# Patient Record
Sex: MALE | Race: WHITE | NOT HISPANIC OR LATINO | URBAN - METROPOLITAN AREA
[De-identification: names, ages, dates, MRNs, and addresses within clinical notes are randomized per-mention and may not be internally consistent; named-entity substitution may affect disease eponyms.]

---

## 2018-04-11 ENCOUNTER — OFFICE VISIT CONVERTED (OUTPATIENT)
Dept: CARDIOLOGY | Facility: CLINIC | Age: 83
End: 2018-04-11
Attending: INTERNAL MEDICINE

## 2018-04-11 ENCOUNTER — CONVERSION ENCOUNTER (OUTPATIENT)
Dept: CARDIOLOGY | Facility: CLINIC | Age: 83
End: 2018-04-11

## 2019-01-04 ENCOUNTER — HOSPITAL ENCOUNTER (OUTPATIENT)
Dept: URGENT CARE | Facility: CLINIC | Age: 84
Discharge: HOME | End: 2019-01-04
Attending: EMERGENCY MEDICINE

## 2019-02-09 ENCOUNTER — HOSPITAL ENCOUNTER (OUTPATIENT)
Dept: OTHER | Facility: HOSPITAL | Age: 84
Discharge: HOME | End: 2019-02-09
Attending: INTERNAL MEDICINE

## 2019-02-09 LAB
ALBUMIN SERPL-MCNC: 4.6 G/DL (ref 3.5–5)
ALBUMIN/GLOB SERPL: 1.4 {RATIO} (ref 1.4–2.6)
ALP SERPL-CCNC: 61 U/L (ref 56–155)
ALT SERPL-CCNC: 28 U/L (ref 10–40)
ANION GAP SERPL CALC-SCNC: 19 MMOL/L (ref 8–19)
AST SERPL-CCNC: 29 U/L (ref 15–50)
BASOPHILS # BLD AUTO: 0.02 10*3/UL (ref 0–0.2)
BASOPHILS NFR BLD AUTO: 0.25 % (ref 0–3)
BILIRUB SERPL-MCNC: 0.43 MG/DL (ref 0.2–1.3)
BUN SERPL-MCNC: 20 MG/DL (ref 5–25)
BUN/CREAT SERPL: 19 {RATIO} (ref 6–20)
CALCIUM SERPL-MCNC: 10.5 MG/DL (ref 8.7–10.4)
CHLORIDE SERPL-SCNC: 101 MMOL/L (ref 99–111)
CONV CO2: 25 MMOL/L (ref 22–32)
CONV TOTAL PROTEIN: 7.9 G/DL (ref 6.3–8.2)
CREAT UR-MCNC: 1.07 MG/DL (ref 0.7–1.2)
EOSINOPHIL # BLD AUTO: 0.32 10*3/UL (ref 0–0.7)
EOSINOPHIL # BLD AUTO: 3.56 % (ref 0–7)
ERYTHROCYTE [DISTWIDTH] IN BLOOD BY AUTOMATED COUNT: 12.9 % (ref 11.5–14.5)
EST. AVERAGE GLUCOSE BLD GHB EST-MCNC: 214 MG/DL
GFR SERPLBLD BASED ON 1.73 SQ M-ARVRAT: >60 ML/MIN/{1.73_M2}
GLOBULIN UR ELPH-MCNC: 3.3 G/DL (ref 2–3.5)
GLUCOSE SERPL-MCNC: 212 MG/DL (ref 70–99)
HBA1C MFR BLD: 14.1 G/DL (ref 14–18)
HBA1C MFR BLD: 9.1 % (ref 3.5–5.7)
HCT VFR BLD AUTO: 40.7 % (ref 42–52)
LYMPHOCYTES # BLD AUTO: 2.48 10*3/UL (ref 1–5)
MCH RBC QN AUTO: 30.7 PG (ref 27–31)
MCHC RBC AUTO-ENTMCNC: 34.7 G/DL (ref 33–37)
MCV RBC AUTO: 88.3 FL (ref 80–96)
MONOCYTES # BLD AUTO: 0.79 10*3/UL (ref 0.2–1.2)
MONOCYTES NFR BLD AUTO: 8.8 % (ref 3–10)
NEUTROPHILS # BLD AUTO: 5.38 10*3/UL (ref 2–8)
NEUTROPHILS NFR BLD AUTO: 59.8 % (ref 30–85)
NRBC BLD AUTO-RTO: 0 % (ref 0–0.01)
OSMOLALITY SERPL CALC.SUM OF ELEC: 299 MOSM/KG (ref 273–304)
PLATELET # BLD AUTO: 191 10*3/UL (ref 130–400)
PMV BLD AUTO: 8.2 FL (ref 7.4–10.4)
POTASSIUM SERPL-SCNC: 4.9 MMOL/L (ref 3.5–5.3)
RBC # BLD AUTO: 4.61 10*6/UL (ref 4.7–6.1)
SODIUM SERPL-SCNC: 140 MMOL/L (ref 135–147)
TSH SERPL-ACNC: 4.56 M[IU]/L (ref 0.27–4.2)
VARIANT LYMPHS NFR BLD MANUAL: 27.6 % (ref 20–45)
WBC # BLD AUTO: 9.01 10*3/UL (ref 4.8–10.8)

## 2019-02-22 ENCOUNTER — HOSPITAL ENCOUNTER (OUTPATIENT)
Dept: GENERAL RADIOLOGY | Facility: HOSPITAL | Age: 84
Discharge: HOME | End: 2019-02-22
Attending: INTERNAL MEDICINE

## 2019-04-02 ENCOUNTER — HOSPITAL ENCOUNTER (OUTPATIENT)
Dept: URGENT CARE | Facility: CLINIC | Age: 84
Discharge: HOME | End: 2019-04-02
Attending: PHYSICIAN ASSISTANT

## 2019-04-04 LAB
AMPICILLIN SUSC ISLT: <=0.25
BACTERIA UR CULT: ABNORMAL
CEFOTAXIME SUSC ISLT: <=0.12
CEFTRIAXONE SUSC ISLT: <=0.12
LEVOFLOXACIN SUSC ISLT: 1
PENICILLIN G SUSC ISLT: <=0.06
TETRACYCLINE SUSC ISLT: >=16
VANCOMYCIN SUSC ISLT: 0.5

## 2019-04-08 ENCOUNTER — HOSPITAL ENCOUNTER (OUTPATIENT)
Dept: LAB | Facility: HOSPITAL | Age: 84
Discharge: HOME | End: 2019-04-08
Attending: PHYSICIAN ASSISTANT

## 2019-04-08 LAB
ALBUMIN SERPL-MCNC: 4.2 G/DL (ref 3.5–5)
ALBUMIN/GLOB SERPL: 1 {RATIO} (ref 1.4–2.6)
ALP SERPL-CCNC: 89 U/L (ref 56–155)
ALT SERPL-CCNC: 18 U/L (ref 10–40)
ANION GAP SERPL CALC-SCNC: 20 MMOL/L (ref 8–19)
APPEARANCE UR: CLEAR
AST SERPL-CCNC: 24 U/L (ref 15–50)
BASOPHILS # BLD AUTO: 0.07 10*3/UL (ref 0–0.2)
BASOPHILS NFR BLD AUTO: 0.7 % (ref 0–3)
BILIRUB SERPL-MCNC: 0.31 MG/DL (ref 0.2–1.3)
BILIRUB UR QL: NEGATIVE
BUN SERPL-MCNC: 21 MG/DL (ref 5–25)
BUN/CREAT SERPL: 18 {RATIO} (ref 6–20)
CALCIUM SERPL-MCNC: 10.2 MG/DL (ref 8.7–10.4)
CHLORIDE SERPL-SCNC: 99 MMOL/L (ref 99–111)
COLOR UR: YELLOW
CONV ABS IMM GRAN: 0.1 10*3/UL (ref 0–0.2)
CONV BACTERIA: NEGATIVE
CONV CO2: 25 MMOL/L (ref 22–32)
CONV COLLECTION SOURCE (UA): ABNORMAL
CONV IMMATURE GRAN: 1 % (ref 0–1.8)
CONV TOTAL PROTEIN: 8.4 G/DL (ref 6.3–8.2)
CONV UROBILINOGEN IN URINE BY AUTOMATED TEST STRIP: 0.2 {EHRLICHU}/DL (ref 0.1–1)
CREAT UR-MCNC: 1.14 MG/DL (ref 0.7–1.2)
DEPRECATED RDW RBC AUTO: 46.1 FL (ref 35.1–43.9)
EOSINOPHIL # BLD AUTO: 0.23 10*3/UL (ref 0–0.7)
EOSINOPHIL # BLD AUTO: 2.4 % (ref 0–7)
ERYTHROCYTE [DISTWIDTH] IN BLOOD BY AUTOMATED COUNT: 13.4 % (ref 11.6–14.4)
GFR SERPLBLD BASED ON 1.73 SQ M-ARVRAT: 59 ML/MIN/{1.73_M2}
GLOBULIN UR ELPH-MCNC: 4.2 G/DL (ref 2–3.5)
GLUCOSE SERPL-MCNC: 256 MG/DL (ref 70–99)
GLUCOSE UR QL: >=1000 MG/DL
HBA1C MFR BLD: 13.2 G/DL (ref 14–18)
HCT VFR BLD AUTO: 42.9 % (ref 42–52)
HGB UR QL STRIP: ABNORMAL
KETONES UR QL STRIP: NEGATIVE MG/DL
LEUKOCYTE ESTERASE UR QL STRIP: NEGATIVE
LYMPHOCYTES # BLD AUTO: 2.77 10*3/UL (ref 1–5)
MCH RBC QN AUTO: 28.7 PG (ref 27–31)
MCHC RBC AUTO-ENTMCNC: 30.8 G/DL (ref 33–37)
MCV RBC AUTO: 93.3 FL (ref 80–96)
MONOCYTES # BLD AUTO: 0.84 10*3/UL (ref 0.2–1.2)
MONOCYTES NFR BLD AUTO: 8.7 % (ref 3–10)
NEUTROPHILS # BLD AUTO: 5.67 10*3/UL (ref 2–8)
NEUTROPHILS NFR BLD AUTO: 58.6 % (ref 30–85)
NITRITE UR QL STRIP: NEGATIVE
NRBC CBCN: 0 % (ref 0–0.7)
OSMOLALITY SERPL CALC.SUM OF ELEC: 298 MOSM/KG (ref 273–304)
PH UR STRIP.AUTO: 5 [PH] (ref 5–8)
PLATELET # BLD AUTO: 239 10*3/UL (ref 130–400)
PMV BLD AUTO: 10.8 FL (ref 9.4–12.4)
POTASSIUM SERPL-SCNC: 5.5 MMOL/L (ref 3.5–5.3)
PROT UR QL: NEGATIVE MG/DL
RBC # BLD AUTO: 4.6 10*6/UL (ref 4.7–6.1)
RBC #/AREA URNS HPF: ABNORMAL /[HPF]
SODIUM SERPL-SCNC: 138 MMOL/L (ref 135–147)
SP GR UR: 1.03 (ref 1–1.03)
VARIANT LYMPHS NFR BLD MANUAL: 28.6 % (ref 20–45)
WBC # BLD AUTO: 9.68 10*3/UL (ref 4.8–10.8)
WBC #/AREA URNS HPF: ABNORMAL /[HPF]

## 2019-04-10 LAB — BACTERIA UR CULT: NORMAL

## 2019-04-17 ENCOUNTER — HOSPITAL ENCOUNTER (OUTPATIENT)
Dept: LAB | Facility: HOSPITAL | Age: 84
Discharge: HOME | End: 2019-04-17
Attending: PHYSICIAN ASSISTANT

## 2019-04-17 LAB
ANION GAP SERPL CALC-SCNC: 19 MMOL/L (ref 8–19)
BUN SERPL-MCNC: 16 MG/DL (ref 5–25)
BUN/CREAT SERPL: 14 {RATIO} (ref 6–20)
CALCIUM SERPL-MCNC: 10 MG/DL (ref 8.7–10.4)
CHLORIDE SERPL-SCNC: 99 MMOL/L (ref 99–111)
CONV CO2: 24 MMOL/L (ref 22–32)
CREAT UR-MCNC: 1.15 MG/DL (ref 0.7–1.2)
GFR SERPLBLD BASED ON 1.73 SQ M-ARVRAT: 58 ML/MIN/{1.73_M2}
GLUCOSE SERPL-MCNC: 373 MG/DL (ref 70–99)
OSMOLALITY SERPL CALC.SUM OF ELEC: 300 MOSM/KG (ref 273–304)
POTASSIUM SERPL-SCNC: 5.1 MMOL/L (ref 3.5–5.3)
SODIUM SERPL-SCNC: 137 MMOL/L (ref 135–147)

## 2019-06-10 ENCOUNTER — OFFICE VISIT CONVERTED (OUTPATIENT)
Dept: SURGERY | Facility: CLINIC | Age: 84
End: 2019-06-10
Attending: UROLOGY

## 2019-07-09 ENCOUNTER — HOSPITAL ENCOUNTER (OUTPATIENT)
Dept: CT IMAGING | Facility: HOSPITAL | Age: 84
Discharge: HOME | End: 2019-07-09
Attending: PHYSICIAN ASSISTANT

## 2019-07-09 LAB
ALBUMIN SERPL-MCNC: 4.3 G/DL (ref 3.5–5)
ALBUMIN/GLOB SERPL: 1 {RATIO} (ref 1.4–2.6)
ALP SERPL-CCNC: 85 U/L (ref 56–155)
ALT SERPL-CCNC: 27 U/L (ref 10–40)
ANION GAP SERPL CALC-SCNC: 24 MMOL/L (ref 8–19)
AST SERPL-CCNC: 29 U/L (ref 15–50)
BASOPHILS # BLD AUTO: 0.1 10*3/UL (ref 0–0.2)
BASOPHILS NFR BLD AUTO: 0.8 % (ref 0–3)
BILIRUB SERPL-MCNC: 0.43 MG/DL (ref 0.2–1.3)
BUN SERPL-MCNC: 15 MG/DL (ref 5–25)
BUN/CREAT SERPL: 13 {RATIO} (ref 6–20)
CALCIUM SERPL-MCNC: 9.9 MG/DL (ref 8.7–10.4)
CHLORIDE SERPL-SCNC: 94 MMOL/L (ref 99–111)
CONV ABS IMM GRAN: 0.06 10*3/UL (ref 0–0.2)
CONV CO2: 22 MMOL/L (ref 22–32)
CONV IMMATURE GRAN: 0.5 % (ref 0–1.8)
CONV TOTAL PROTEIN: 8.4 G/DL (ref 6.3–8.2)
CREAT UR-MCNC: 1.15 MG/DL (ref 0.7–1.2)
DEPRECATED RDW RBC AUTO: 45.2 FL (ref 35.1–43.9)
EOSINOPHIL # BLD AUTO: 0.12 10*3/UL (ref 0–0.7)
EOSINOPHIL # BLD AUTO: 1 % (ref 0–7)
ERYTHROCYTE [DISTWIDTH] IN BLOOD BY AUTOMATED COUNT: 13.8 % (ref 11.6–14.4)
GFR SERPLBLD BASED ON 1.73 SQ M-ARVRAT: 58 ML/MIN/{1.73_M2}
GLOBULIN UR ELPH-MCNC: 4.1 G/DL (ref 2–3.5)
GLUCOSE SERPL-MCNC: 424 MG/DL (ref 70–99)
HBA1C MFR BLD: 13.4 G/DL (ref 14–18)
HCT VFR BLD AUTO: 43.2 % (ref 42–52)
LYMPHOCYTES # BLD AUTO: 2.95 10*3/UL (ref 1–5)
MCH RBC QN AUTO: 28 PG (ref 27–31)
MCHC RBC AUTO-ENTMCNC: 31 G/DL (ref 33–37)
MCV RBC AUTO: 90.2 FL (ref 80–96)
MONOCYTES # BLD AUTO: 1.33 10*3/UL (ref 0.2–1.2)
MONOCYTES NFR BLD AUTO: 11.2 % (ref 3–10)
NEUTROPHILS # BLD AUTO: 7.29 10*3/UL (ref 2–8)
NEUTROPHILS NFR BLD AUTO: 61.6 % (ref 30–85)
NRBC CBCN: 0 % (ref 0–0.7)
OSMOLALITY SERPL CALC.SUM OF ELEC: 299 MOSM/KG (ref 273–304)
PLATELET # BLD AUTO: 209 10*3/UL (ref 130–400)
PMV BLD AUTO: 11.3 FL (ref 9.4–12.4)
POTASSIUM SERPL-SCNC: 4.6 MMOL/L (ref 3.5–5.3)
RBC # BLD AUTO: 4.79 10*6/UL (ref 4.7–6.1)
SODIUM SERPL-SCNC: 135 MMOL/L (ref 135–147)
VARIANT LYMPHS NFR BLD MANUAL: 24.9 % (ref 20–45)
WBC # BLD AUTO: 11.85 10*3/UL (ref 4.8–10.8)

## 2019-07-16 ENCOUNTER — HOSPITAL ENCOUNTER (OUTPATIENT)
Dept: PET IMAGING | Facility: HOSPITAL | Age: 84
Discharge: HOME | End: 2019-07-16
Attending: PHYSICIAN ASSISTANT

## 2019-07-23 ENCOUNTER — OFFICE VISIT CONVERTED (OUTPATIENT)
Dept: ONCOLOGY | Facility: HOSPITAL | Age: 84
End: 2019-07-23
Attending: INTERNAL MEDICINE

## 2019-08-02 ENCOUNTER — HOSPITAL ENCOUNTER (OUTPATIENT)
Dept: GASTROENTEROLOGY | Facility: HOSPITAL | Age: 84
Setting detail: HOSPITAL OUTPATIENT SURGERY
Discharge: HOME | End: 2019-08-02
Attending: INTERNAL MEDICINE

## 2019-08-02 LAB — GLUCOSE BLD-MCNC: 147 MG/DL (ref 70–99)

## 2019-08-06 ENCOUNTER — HOSPITAL ENCOUNTER (OUTPATIENT)
Dept: ONCOLOGY | Facility: HOSPITAL | Age: 84
Discharge: HOME | End: 2019-08-06
Attending: INTERNAL MEDICINE

## 2019-08-06 ENCOUNTER — OFFICE VISIT CONVERTED (OUTPATIENT)
Dept: ONCOLOGY | Facility: HOSPITAL | Age: 84
End: 2019-08-06
Attending: INTERNAL MEDICINE

## 2019-08-06 LAB
ALBUMIN SERPL-MCNC: 4.4 G/DL (ref 3.5–5)
ALBUMIN/GLOB SERPL: 1.2 {RATIO} (ref 1.4–2.6)
ALP SERPL-CCNC: 75 U/L (ref 56–155)
ALT SERPL-CCNC: 18 U/L (ref 10–40)
ANION GAP SERPL CALC-SCNC: 21 MMOL/L (ref 8–19)
AST SERPL-CCNC: 23 U/L (ref 15–50)
BASOPHILS # BLD AUTO: 0.08 10*3/UL (ref 0–0.2)
BASOPHILS NFR BLD AUTO: 1 % (ref 0–3)
BILIRUB SERPL-MCNC: 0.28 MG/DL (ref 0.2–1.3)
BUN SERPL-MCNC: 13 MG/DL (ref 5–25)
BUN/CREAT SERPL: 14 {RATIO} (ref 6–20)
CALCIUM SERPL-MCNC: 10.2 MG/DL (ref 8.7–10.4)
CHLORIDE SERPL-SCNC: 99 MMOL/L (ref 99–111)
CONV ABS IMM GRAN: 0.03 10*3/UL (ref 0–0.2)
CONV CO2: 25 MMOL/L (ref 22–32)
CONV IMMATURE GRAN: 0.4 % (ref 0–1.8)
CONV TOTAL PROTEIN: 8 G/DL (ref 6.3–8.2)
CREAT UR-MCNC: 0.93 MG/DL (ref 0.7–1.2)
DEPRECATED RDW RBC AUTO: 47.5 FL (ref 35.1–43.9)
EOSINOPHIL # BLD AUTO: 0.31 10*3/UL (ref 0–0.7)
EOSINOPHIL # BLD AUTO: 3.7 % (ref 0–7)
ERYTHROCYTE [DISTWIDTH] IN BLOOD BY AUTOMATED COUNT: 14.4 % (ref 11.6–14.4)
GFR SERPLBLD BASED ON 1.73 SQ M-ARVRAT: >60 ML/MIN/{1.73_M2}
GLOBULIN UR ELPH-MCNC: 3.6 G/DL (ref 2–3.5)
GLUCOSE SERPL-MCNC: 187 MG/DL (ref 70–99)
HCT VFR BLD AUTO: 42.2 % (ref 42–52)
HGB BLD-MCNC: 13 G/DL (ref 14–18)
LYMPHOCYTES # BLD AUTO: 2.73 10*3/UL (ref 1–5)
LYMPHOCYTES NFR BLD AUTO: 32.5 % (ref 20–45)
MCH RBC QN AUTO: 27.7 PG (ref 27–31)
MCHC RBC AUTO-ENTMCNC: 30.8 G/DL (ref 33–37)
MCV RBC AUTO: 89.8 FL (ref 80–96)
MONOCYTES # BLD AUTO: 0.72 10*3/UL (ref 0.2–1.2)
MONOCYTES NFR BLD AUTO: 8.6 % (ref 3–10)
NEUTROPHILS # BLD AUTO: 4.52 10*3/UL (ref 2–8)
NEUTROPHILS NFR BLD AUTO: 53.8 % (ref 30–85)
NRBC CBCN: 0 % (ref 0–0.7)
OSMOLALITY SERPL CALC.SUM OF ELEC: 295 MOSM/KG (ref 273–304)
PLATELET # BLD AUTO: 207 10*3/UL (ref 130–400)
PMV BLD AUTO: 10.5 FL (ref 9.4–12.4)
POTASSIUM SERPL-SCNC: 4.9 MMOL/L (ref 3.5–5.3)
RBC # BLD AUTO: 4.7 10*6/UL (ref 4.7–6.1)
SODIUM SERPL-SCNC: 140 MMOL/L (ref 135–147)
WBC # BLD AUTO: 8.39 10*3/UL (ref 4.8–10.8)

## 2019-08-07 ENCOUNTER — HOSPITAL ENCOUNTER (OUTPATIENT)
Dept: CARDIOLOGY | Facility: HOSPITAL | Age: 84
Discharge: HOME | End: 2019-08-07
Attending: INTERNAL MEDICINE

## 2019-08-09 ENCOUNTER — HOSPITAL ENCOUNTER (OUTPATIENT)
Dept: RADIATION ONCOLOGY | Facility: HOSPITAL | Age: 84
Setting detail: RECURRING SERIES
Discharge: HOME | End: 2019-08-31
Attending: RADIOLOGY

## 2019-08-12 ENCOUNTER — HOSPITAL ENCOUNTER (OUTPATIENT)
Dept: MRI IMAGING | Facility: HOSPITAL | Age: 84
Discharge: HOME | End: 2019-08-12
Attending: NURSE PRACTITIONER

## 2019-08-13 ENCOUNTER — HOSPITAL ENCOUNTER (OUTPATIENT)
Dept: OTHER | Facility: HOSPITAL | Age: 84
Discharge: HOME | End: 2019-08-13
Attending: THORACIC SURGERY (CARDIOTHORACIC VASCULAR SURGERY)

## 2019-08-13 ENCOUNTER — OFFICE VISIT CONVERTED (OUTPATIENT)
Dept: ONCOLOGY | Facility: HOSPITAL | Age: 84
End: 2019-08-13
Attending: INTERNAL MEDICINE

## 2019-08-13 ENCOUNTER — HOSPITAL ENCOUNTER (OUTPATIENT)
Dept: ONCOLOGY | Facility: HOSPITAL | Age: 84
Setting detail: RECURRING SERIES
Discharge: HOME | End: 2019-08-31
Attending: INTERNAL MEDICINE

## 2019-08-15 ENCOUNTER — HOSPITAL ENCOUNTER (OUTPATIENT)
Dept: GENERAL RADIOLOGY | Facility: HOSPITAL | Age: 84
Discharge: HOME | End: 2019-08-15
Attending: NURSE PRACTITIONER

## 2019-08-15 ENCOUNTER — TELEPHONE CONVERTED (OUTPATIENT)
Dept: ONCOLOGY | Facility: HOSPITAL | Age: 84
End: 2019-08-15

## 2019-08-21 ENCOUNTER — HOSPITAL ENCOUNTER (OUTPATIENT)
Dept: OTHER | Facility: HOSPITAL | Age: 84
Setting detail: RECURRING SERIES
Discharge: HOME | End: 2019-08-31
Attending: INTERNAL MEDICINE

## 2019-08-23 ENCOUNTER — OFFICE VISIT CONVERTED (OUTPATIENT)
Dept: SURGERY | Facility: CLINIC | Age: 84
End: 2019-08-23
Attending: SURGERY

## 2019-08-26 ENCOUNTER — HOSPITAL ENCOUNTER (OUTPATIENT)
Dept: PERIOP | Facility: HOSPITAL | Age: 84
Setting detail: HOSPITAL OUTPATIENT SURGERY
Discharge: HOME | End: 2019-08-26
Attending: SURGERY

## 2019-08-26 LAB
GLUCOSE BLD-MCNC: 120 MG/DL (ref 70–99)
GLUCOSE BLD-MCNC: 129 MG/DL (ref 70–99)

## 2019-08-27 LAB
ALBUMIN SERPL-MCNC: 4.1 G/DL (ref 3.5–5)
ALBUMIN/GLOB SERPL: 1.3 {RATIO} (ref 1.4–2.6)
ALP SERPL-CCNC: 59 U/L (ref 56–155)
ALT SERPL-CCNC: 12 U/L (ref 10–40)
ANION GAP SERPL CALC-SCNC: 19 MMOL/L (ref 8–19)
AST SERPL-CCNC: 21 U/L (ref 15–50)
BASOPHILS # BLD AUTO: 0.06 10*3/UL (ref 0–0.2)
BASOPHILS NFR BLD AUTO: 0.6 % (ref 0–3)
BILIRUB SERPL-MCNC: 0.42 MG/DL (ref 0.2–1.3)
BUN SERPL-MCNC: 14 MG/DL (ref 5–25)
BUN/CREAT SERPL: 14 {RATIO} (ref 6–20)
CALCIUM SERPL-MCNC: 9.4 MG/DL (ref 8.7–10.4)
CHLORIDE SERPL-SCNC: 101 MMOL/L (ref 99–111)
CONV ABS IMM GRAN: 0.02 10*3/UL (ref 0–0.54)
CONV CO2: 23 MMOL/L (ref 22–32)
CONV EOSINOPHILS PERCENT BY MANUAL COUNT: 5.1 % (ref 0–7)
CONV IMMATURE GRAN: 0.2 % (ref 0–0.4)
CONV TOTAL PROTEIN: 7.2 G/DL (ref 6.3–8.2)
CREAT UR-MCNC: 1.01 MG/DL (ref 0.7–1.2)
EOSINOPHIL # BLD MANUAL: 0.48 10*3/UL (ref 0–0.7)
ERYTHROCYTE [DISTWIDTH] IN BLOOD BY AUTOMATED COUNT: 15.1 % (ref 11.5–14.5)
ERYTHROCYTE [DISTWIDTH] IN BLOOD BY AUTOMATED COUNT: 49.7 FL
GFR SERPLBLD BASED ON 1.73 SQ M-ARVRAT: >60 ML/MIN/{1.73_M2}
GLOBULIN UR ELPH-MCNC: 3.1 G/DL (ref 2–3.5)
GLUCOSE SERPL-MCNC: 199 MG/DL (ref 70–99)
HBA1C MFR BLD: 11.6 G/DL (ref 14–18)
HCT VFR BLD AUTO: 37.3 % (ref 42–52)
LYMPHOCYTES # BLD AUTO: 2.31 10*3/UL (ref 1–5)
LYMPHOCYTES NFR BLD AUTO: 24.5 % (ref 20–45)
MCH RBC QN AUTO: 28.1 PG (ref 27–31)
MCHC RBC AUTO-ENTMCNC: 31.1 G/DL (ref 33–37)
MCV RBC AUTO: 90.3 FL (ref 80–96)
MONOCYTES # BLD AUTO: 0.94 10*3/UL (ref 0.2–1.2)
MONOCYTES NFR BLD MANUAL: 10 % (ref 3–10)
NEUTROPHILS # BLD AUTO: 5.61 10*3/UL (ref 2–8)
NEUTROPHILS NFR BLD MANUAL: 59.6 % (ref 30–85)
OSMOLALITY SERPL CALC.SUM OF ELEC: 294 MOSM/KG (ref 273–304)
PLATELET # BLD AUTO: 188 10*3/UL (ref 130–400)
PMV BLD AUTO: 9.9 FL (ref 7.4–10.4)
POTASSIUM SERPL-SCNC: 4 MMOL/L (ref 3.5–5.3)
RBC MORPH BLD: 4.13 10*6/UL (ref 4.7–6.1)
SODIUM SERPL-SCNC: 139 MMOL/L (ref 135–147)
WBC # BLD AUTO: 9.42 10*3/UL (ref 4.8–10.8)

## 2019-08-29 LAB
ALBUMIN SERPL-MCNC: 4.2 G/DL (ref 3.5–5)
ALBUMIN/GLOB SERPL: 1.3 {RATIO} (ref 1.4–2.6)
ALP SERPL-CCNC: 58 U/L (ref 56–155)
ALT SERPL-CCNC: 16 U/L (ref 10–40)
ANION GAP SERPL CALC-SCNC: 18 MMOL/L (ref 8–19)
AST SERPL-CCNC: 29 U/L (ref 15–50)
BASOPHILS # BLD AUTO: 0.05 10*3/UL (ref 0–0.2)
BASOPHILS NFR BLD AUTO: 0.5 % (ref 0–3)
BILIRUB SERPL-MCNC: 0.34 MG/DL (ref 0.2–1.3)
BUN SERPL-MCNC: 21 MG/DL (ref 5–25)
BUN/CREAT SERPL: 20 {RATIO} (ref 6–20)
CALCIUM SERPL-MCNC: 9.7 MG/DL (ref 8.7–10.4)
CHLORIDE SERPL-SCNC: 101 MMOL/L (ref 99–111)
CONV ABS IMM GRAN: 0.04 10*3/UL (ref 0–0.2)
CONV CO2: 24 MMOL/L (ref 22–32)
CONV IMMATURE GRAN: 0.4 % (ref 0–1.8)
CONV TOTAL PROTEIN: 7.5 G/DL (ref 6.3–8.2)
CREAT UR-MCNC: 1.06 MG/DL (ref 0.7–1.2)
DEPRECATED RDW RBC AUTO: 49.4 FL (ref 35.1–43.9)
EOSINOPHIL # BLD AUTO: 0.17 10*3/UL (ref 0–0.7)
EOSINOPHIL # BLD AUTO: 1.8 % (ref 0–7)
ERYTHROCYTE [DISTWIDTH] IN BLOOD BY AUTOMATED COUNT: 15.1 % (ref 11.6–14.4)
EST. AVERAGE GLUCOSE BLD GHB EST-MCNC: 226 MG/DL
GFR SERPLBLD BASED ON 1.73 SQ M-ARVRAT: >60 ML/MIN/{1.73_M2}
GLOBULIN UR ELPH-MCNC: 3.3 G/DL (ref 2–3.5)
GLUCOSE SERPL-MCNC: 162 MG/DL (ref 70–99)
HBA1C MFR BLD: 9.5 % (ref 3.5–5.7)
HCT VFR BLD AUTO: 37.4 % (ref 42–52)
HGB BLD-MCNC: 11.8 G/DL (ref 14–18)
LYMPHOCYTES # BLD AUTO: 1.61 10*3/UL (ref 1–5)
LYMPHOCYTES NFR BLD AUTO: 17.1 % (ref 20–45)
MCH RBC QN AUTO: 28.4 PG (ref 27–31)
MCHC RBC AUTO-ENTMCNC: 31.6 G/DL (ref 33–37)
MCV RBC AUTO: 89.9 FL (ref 80–96)
MONOCYTES # BLD AUTO: 0.35 10*3/UL (ref 0.2–1.2)
MONOCYTES NFR BLD AUTO: 3.7 % (ref 3–10)
NEUTROPHILS # BLD AUTO: 7.17 10*3/UL (ref 2–8)
NEUTROPHILS NFR BLD AUTO: 76.5 % (ref 30–85)
NRBC CBCN: 0 % (ref 0–0.7)
OSMOLALITY SERPL CALC.SUM OF ELEC: 295 MOSM/KG (ref 273–304)
PLATELET # BLD AUTO: 180 10*3/UL (ref 130–400)
PMV BLD AUTO: 11 FL (ref 9.4–12.4)
POTASSIUM SERPL-SCNC: 4.3 MMOL/L (ref 3.5–5.3)
RBC # BLD AUTO: 4.16 10*6/UL (ref 4.7–6.1)
SODIUM SERPL-SCNC: 139 MMOL/L (ref 135–147)
WBC # BLD AUTO: 9.39 10*3/UL (ref 4.8–10.8)

## 2019-09-03 ENCOUNTER — HOSPITAL ENCOUNTER (OUTPATIENT)
Dept: RADIATION ONCOLOGY | Facility: HOSPITAL | Age: 84
Setting detail: RECURRING SERIES
Discharge: HOME | End: 2019-09-30
Attending: RADIOLOGY

## 2019-09-03 ENCOUNTER — HOSPITAL ENCOUNTER (OUTPATIENT)
Dept: OTHER | Facility: HOSPITAL | Age: 84
Setting detail: RECURRING SERIES
Discharge: HOME | End: 2019-09-30
Attending: INTERNAL MEDICINE

## 2019-09-03 ENCOUNTER — OFFICE VISIT CONVERTED (OUTPATIENT)
Dept: ONCOLOGY | Facility: HOSPITAL | Age: 84
End: 2019-09-03
Attending: INTERNAL MEDICINE

## 2019-09-03 LAB
ALBUMIN SERPL-MCNC: 4.2 G/DL (ref 3.5–5)
ALBUMIN/GLOB SERPL: 1.2 {RATIO} (ref 1.4–2.6)
ALP SERPL-CCNC: 72 U/L (ref 56–155)
ALT SERPL-CCNC: 15 U/L (ref 10–40)
ANION GAP SERPL CALC-SCNC: 20 MMOL/L (ref 8–19)
AST SERPL-CCNC: 23 U/L (ref 15–50)
BASOPHILS # BLD AUTO: 0.06 10*3/UL (ref 0–0.2)
BASOPHILS NFR BLD AUTO: 0.9 % (ref 0–3)
BILIRUB SERPL-MCNC: 0.34 MG/DL (ref 0.2–1.3)
BUN SERPL-MCNC: 19 MG/DL (ref 5–25)
BUN/CREAT SERPL: 20 {RATIO} (ref 6–20)
CALCIUM SERPL-MCNC: 9.8 MG/DL (ref 8.7–10.4)
CHLORIDE SERPL-SCNC: 101 MMOL/L (ref 99–111)
CONV ABS IMM GRAN: 0.06 10*3/UL (ref 0–0.2)
CONV CO2: 23 MMOL/L (ref 22–32)
CONV IMMATURE GRAN: 0.9 % (ref 0–1.8)
CONV TOTAL PROTEIN: 7.6 G/DL (ref 6.3–8.2)
CREAT UR-MCNC: 0.96 MG/DL (ref 0.7–1.2)
DEPRECATED RDW RBC AUTO: 49.1 FL (ref 35.1–43.9)
EOSINOPHIL # BLD AUTO: 0.28 10*3/UL (ref 0–0.7)
EOSINOPHIL # BLD AUTO: 4 % (ref 0–7)
ERYTHROCYTE [DISTWIDTH] IN BLOOD BY AUTOMATED COUNT: 14.9 % (ref 11.6–14.4)
GFR SERPLBLD BASED ON 1.73 SQ M-ARVRAT: >60 ML/MIN/{1.73_M2}
GLOBULIN UR ELPH-MCNC: 3.4 G/DL (ref 2–3.5)
GLUCOSE SERPL-MCNC: 180 MG/DL (ref 70–99)
HCT VFR BLD AUTO: 35.6 % (ref 42–52)
HGB BLD-MCNC: 11.2 G/DL (ref 14–18)
LYMPHOCYTES # BLD AUTO: 1.7 10*3/UL (ref 1–5)
LYMPHOCYTES NFR BLD AUTO: 24.3 % (ref 20–45)
MCH RBC QN AUTO: 28.4 PG (ref 27–31)
MCHC RBC AUTO-ENTMCNC: 31.5 G/DL (ref 33–37)
MCV RBC AUTO: 90.4 FL (ref 80–96)
MONOCYTES # BLD AUTO: 0.43 10*3/UL (ref 0.2–1.2)
MONOCYTES NFR BLD AUTO: 6.1 % (ref 3–10)
NEUTROPHILS # BLD AUTO: 4.48 10*3/UL (ref 2–8)
NEUTROPHILS NFR BLD AUTO: 63.8 % (ref 30–85)
NRBC CBCN: 0 % (ref 0–0.7)
OSMOLALITY SERPL CALC.SUM OF ELEC: 297 MOSM/KG (ref 273–304)
PLATELET # BLD AUTO: 214 10*3/UL (ref 130–400)
PMV BLD AUTO: 10.9 FL (ref 9.4–12.4)
POTASSIUM SERPL-SCNC: 4.3 MMOL/L (ref 3.5–5.3)
RBC # BLD AUTO: 3.94 10*6/UL (ref 4.7–6.1)
SODIUM SERPL-SCNC: 140 MMOL/L (ref 135–147)
WBC # BLD AUTO: 7.01 10*3/UL (ref 4.8–10.8)

## 2019-09-10 ENCOUNTER — HOSPITAL ENCOUNTER (OUTPATIENT)
Dept: ONCOLOGY | Facility: HOSPITAL | Age: 84
Discharge: HOME | End: 2019-09-10
Attending: INTERNAL MEDICINE

## 2019-09-10 ENCOUNTER — OFFICE VISIT CONVERTED (OUTPATIENT)
Dept: ONCOLOGY | Facility: HOSPITAL | Age: 84
End: 2019-09-10
Attending: INTERNAL MEDICINE

## 2019-09-10 LAB
BASOPHILS # BLD AUTO: 0.05 10*3/UL (ref 0–0.2)
BASOPHILS NFR BLD AUTO: 1.1 % (ref 0–3)
CONV ABS IMM GRAN: 0.04 10*3/UL (ref 0–0.2)
CONV IMMATURE GRAN: 0.9 % (ref 0–1.8)
DEPRECATED RDW RBC AUTO: 49.4 FL (ref 35.1–43.9)
EOSINOPHIL # BLD AUTO: 0.03 10*3/UL (ref 0–0.7)
EOSINOPHIL # BLD AUTO: 0.7 % (ref 0–7)
ERYTHROCYTE [DISTWIDTH] IN BLOOD BY AUTOMATED COUNT: 15.6 % (ref 11.6–14.4)
HCT VFR BLD AUTO: 30.8 % (ref 42–52)
HGB BLD-MCNC: 9.8 G/DL (ref 14–18)
LYMPHOCYTES # BLD AUTO: 1.02 10*3/UL (ref 1–5)
LYMPHOCYTES NFR BLD AUTO: 22.2 % (ref 20–45)
MCH RBC QN AUTO: 28.7 PG (ref 27–31)
MCHC RBC AUTO-ENTMCNC: 31.8 G/DL (ref 33–37)
MCV RBC AUTO: 90.1 FL (ref 80–96)
MONOCYTES # BLD AUTO: 0.44 10*3/UL (ref 0.2–1.2)
MONOCYTES NFR BLD AUTO: 9.6 % (ref 3–10)
NEUTROPHILS # BLD AUTO: 3.02 10*3/UL (ref 2–8)
NEUTROPHILS NFR BLD AUTO: 65.5 % (ref 30–85)
NRBC CBCN: 0.4 % (ref 0–0.7)
PLATELET # BLD AUTO: 201 10*3/UL (ref 130–400)
PMV BLD AUTO: 10.7 FL (ref 9.4–12.4)
RBC # BLD AUTO: 3.42 10*6/UL (ref 4.7–6.1)
WBC # BLD AUTO: 4.6 10*3/UL (ref 4.8–10.8)

## 2019-09-17 ENCOUNTER — OFFICE VISIT CONVERTED (OUTPATIENT)
Dept: ONCOLOGY | Facility: HOSPITAL | Age: 84
End: 2019-09-17
Attending: INTERNAL MEDICINE

## 2019-09-17 LAB
ALBUMIN SERPL-MCNC: 4.1 G/DL (ref 3.5–5)
ALBUMIN/GLOB SERPL: 1.3 {RATIO} (ref 1.4–2.6)
ALP SERPL-CCNC: 64 U/L (ref 56–155)
ALT SERPL-CCNC: 10 U/L (ref 10–40)
ANION GAP SERPL CALC-SCNC: 16 MMOL/L (ref 8–19)
AST SERPL-CCNC: 17 U/L (ref 15–50)
BASOPHILS # BLD AUTO: 0.07 10*3/UL (ref 0–0.2)
BASOPHILS NFR BLD AUTO: 1.2 % (ref 0–3)
BILIRUB SERPL-MCNC: 0.26 MG/DL (ref 0.2–1.3)
BUN SERPL-MCNC: 9 MG/DL (ref 5–25)
BUN/CREAT SERPL: 11 {RATIO} (ref 6–20)
CALCIUM SERPL-MCNC: 9.6 MG/DL (ref 8.7–10.4)
CHLORIDE SERPL-SCNC: 107 MMOL/L (ref 99–111)
CONV ABS IMM GRAN: 0.09 10*3/UL (ref 0–0.2)
CONV CO2: 23 MMOL/L (ref 22–32)
CONV IMMATURE GRAN: 1.6 % (ref 0–1.8)
CONV TOTAL PROTEIN: 7.2 G/DL (ref 6.3–8.2)
CREAT UR-MCNC: 0.82 MG/DL (ref 0.7–1.2)
DEPRECATED RDW RBC AUTO: 55.8 FL (ref 35.1–43.9)
EOSINOPHIL # BLD AUTO: 0.09 10*3/UL (ref 0–0.7)
EOSINOPHIL # BLD AUTO: 1.6 % (ref 0–7)
ERYTHROCYTE [DISTWIDTH] IN BLOOD BY AUTOMATED COUNT: 17.1 % (ref 11.6–14.4)
GFR SERPLBLD BASED ON 1.73 SQ M-ARVRAT: >60 ML/MIN/{1.73_M2}
GLOBULIN UR ELPH-MCNC: 3.1 G/DL (ref 2–3.5)
GLUCOSE SERPL-MCNC: 183 MG/DL (ref 70–99)
HCT VFR BLD AUTO: 35.2 % (ref 42–52)
HGB BLD-MCNC: 10.8 G/DL (ref 14–18)
LYMPHOCYTES # BLD AUTO: 1.1 10*3/UL (ref 1–5)
LYMPHOCYTES NFR BLD AUTO: 19.4 % (ref 20–45)
MCH RBC QN AUTO: 28.4 PG (ref 27–31)
MCHC RBC AUTO-ENTMCNC: 30.7 G/DL (ref 33–37)
MCV RBC AUTO: 92.6 FL (ref 80–96)
MONOCYTES # BLD AUTO: 0.88 10*3/UL (ref 0.2–1.2)
MONOCYTES NFR BLD AUTO: 15.5 % (ref 3–10)
NEUTROPHILS # BLD AUTO: 3.43 10*3/UL (ref 2–8)
NEUTROPHILS NFR BLD AUTO: 60.7 % (ref 30–85)
NRBC CBCN: 0 % (ref 0–0.7)
OSMOLALITY SERPL CALC.SUM OF ELEC: 295 MOSM/KG (ref 273–304)
PLATELET # BLD AUTO: 198 10*3/UL (ref 130–400)
PMV BLD AUTO: 9.6 FL (ref 9.4–12.4)
POTASSIUM SERPL-SCNC: 4.7 MMOL/L (ref 3.5–5.3)
RBC # BLD AUTO: 3.8 10*6/UL (ref 4.7–6.1)
SODIUM SERPL-SCNC: 141 MMOL/L (ref 135–147)
WBC # BLD AUTO: 5.66 10*3/UL (ref 4.8–10.8)

## 2019-09-18 ENCOUNTER — HOSPITAL ENCOUNTER (OUTPATIENT)
Dept: CARDIOLOGY | Facility: HOSPITAL | Age: 84
Discharge: HOME | End: 2019-09-18
Attending: RADIOLOGY

## 2019-09-24 ENCOUNTER — OFFICE VISIT CONVERTED (OUTPATIENT)
Dept: ONCOLOGY | Facility: HOSPITAL | Age: 84
End: 2019-09-24
Attending: INTERNAL MEDICINE

## 2019-09-24 LAB
ALBUMIN SERPL-MCNC: 4.2 G/DL (ref 3.5–5)
ALBUMIN/GLOB SERPL: 1.4 {RATIO} (ref 1.4–2.6)
ALP SERPL-CCNC: 57 U/L (ref 56–155)
ALT SERPL-CCNC: 12 U/L (ref 10–40)
ANION GAP SERPL CALC-SCNC: 15 MMOL/L (ref 8–19)
AST SERPL-CCNC: 19 U/L (ref 15–50)
BASOPHILS # BLD AUTO: 0.04 10*3/UL (ref 0–0.2)
BASOPHILS NFR BLD AUTO: 0.9 % (ref 0–3)
BILIRUB SERPL-MCNC: 0.28 MG/DL (ref 0.2–1.3)
BUN SERPL-MCNC: 20 MG/DL (ref 5–25)
BUN/CREAT SERPL: 22 {RATIO} (ref 6–20)
CALCIUM SERPL-MCNC: 9.6 MG/DL (ref 8.7–10.4)
CHLORIDE SERPL-SCNC: 103 MMOL/L (ref 99–111)
CONV ABS IMM GRAN: 0.02 10*3/UL (ref 0–0.54)
CONV CO2: 22 MMOL/L (ref 22–32)
CONV EOSINOPHILS PERCENT BY MANUAL COUNT: 4.2 % (ref 0–7)
CONV IMMATURE GRAN: 0.5 % (ref 0–0.4)
CONV TOTAL PROTEIN: 7.1 G/DL (ref 6.3–8.2)
CREAT UR-MCNC: 0.92 MG/DL (ref 0.7–1.2)
EOSINOPHIL # BLD MANUAL: 0.18 10*3/UL (ref 0–0.7)
ERYTHROCYTE [DISTWIDTH] IN BLOOD BY AUTOMATED COUNT: 16 % (ref 11.5–14.5)
ERYTHROCYTE [DISTWIDTH] IN BLOOD BY AUTOMATED COUNT: 54.1 FL
GFR SERPLBLD BASED ON 1.73 SQ M-ARVRAT: >60 ML/MIN/{1.73_M2}
GLOBULIN UR ELPH-MCNC: 2.9 G/DL (ref 2–3.5)
GLUCOSE SERPL-MCNC: 145 MG/DL (ref 70–99)
HBA1C MFR BLD: 10.2 G/DL (ref 14–18)
HCT VFR BLD AUTO: 33 % (ref 42–52)
LYMPHOCYTES # BLD AUTO: 0.61 10*3/UL (ref 1–5)
LYMPHOCYTES NFR BLD AUTO: 14.3 % (ref 20–45)
MCH RBC QN AUTO: 28.7 PG (ref 27–31)
MCHC RBC AUTO-ENTMCNC: 30.9 G/DL (ref 33–37)
MCV RBC AUTO: 93 FL (ref 80–96)
MONOCYTES # BLD AUTO: 0.2 10*3/UL (ref 0.2–1.2)
MONOCYTES NFR BLD MANUAL: 4.7 % (ref 3–10)
NEUTROPHILS # BLD AUTO: 3.21 10*3/UL (ref 2–8)
NEUTROPHILS NFR BLD MANUAL: 75.4 % (ref 30–85)
OSMOLALITY SERPL CALC.SUM OF ELEC: 285 MOSM/KG (ref 273–304)
PLATELET # BLD AUTO: 156 10*3/UL (ref 130–400)
PMV BLD AUTO: 10 FL (ref 7.4–10.4)
POTASSIUM SERPL-SCNC: 4.7 MMOL/L (ref 3.5–5.3)
RBC MORPH BLD: 3.55 10*6/UL (ref 4.7–6.1)
SODIUM SERPL-SCNC: 135 MMOL/L (ref 135–147)
WBC # BLD AUTO: 4.26 10*3/UL (ref 4.8–10.8)

## 2019-10-01 ENCOUNTER — HOSPITAL ENCOUNTER (OUTPATIENT)
Dept: RADIATION ONCOLOGY | Facility: HOSPITAL | Age: 84
Setting detail: RECURRING SERIES
Discharge: HOME | End: 2019-10-31
Attending: RADIOLOGY

## 2019-10-02 ENCOUNTER — HOSPITAL ENCOUNTER (OUTPATIENT)
Dept: INFUSION THERAPY | Facility: HOSPITAL | Age: 84
Discharge: HOME | End: 2019-10-02
Attending: NURSE PRACTITIONER

## 2019-10-25 ENCOUNTER — OFFICE VISIT CONVERTED (OUTPATIENT)
Dept: SURGERY | Facility: CLINIC | Age: 84
End: 2019-10-25
Attending: SURGERY

## 2019-10-29 ENCOUNTER — HOSPITAL ENCOUNTER (OUTPATIENT)
Dept: PERIOP | Facility: HOSPITAL | Age: 84
Setting detail: HOSPITAL OUTPATIENT SURGERY
Discharge: HOME | End: 2019-10-29
Attending: SURGERY

## 2019-10-29 LAB
GLUCOSE BLD-MCNC: 102 MG/DL (ref 70–99)
GLUCOSE BLD-MCNC: 97 MG/DL (ref 70–99)

## 2020-01-02 ENCOUNTER — HOSPITAL ENCOUNTER (OUTPATIENT)
Dept: GENERAL RADIOLOGY | Facility: HOSPITAL | Age: 85
Discharge: HOME | End: 2020-01-02
Attending: NURSE PRACTITIONER

## 2020-01-02 ENCOUNTER — HOSPITAL ENCOUNTER (OUTPATIENT)
Dept: RADIATION ONCOLOGY | Facility: HOSPITAL | Age: 85
Setting detail: RECURRING SERIES
Discharge: HOME | End: 2020-01-31
Attending: RADIOLOGY

## 2020-01-23 ENCOUNTER — HOSPITAL ENCOUNTER (OUTPATIENT)
Dept: URGENT CARE | Facility: CLINIC | Age: 85
Discharge: HOME | End: 2020-01-23

## 2020-01-24 ENCOUNTER — HOSPITAL ENCOUNTER (OUTPATIENT)
Dept: CT IMAGING | Facility: HOSPITAL | Age: 85
Discharge: HOME | End: 2020-01-24
Attending: NURSE PRACTITIONER

## 2020-01-24 LAB
CREAT BLD-MCNC: 1 MG/DL (ref 0.6–1.4)
GFR SERPLBLD BASED ON 1.73 SQ M-ARVRAT: >60 ML/MIN/{1.73_M2}

## 2020-01-28 ENCOUNTER — HOSPITAL ENCOUNTER (OUTPATIENT)
Dept: LAB | Facility: HOSPITAL | Age: 85
Discharge: HOME | End: 2020-01-28
Attending: PHYSICIAN ASSISTANT

## 2020-01-28 LAB
ALBUMIN SERPL-MCNC: 3.8 G/DL (ref 3.5–5)
ALBUMIN/GLOB SERPL: 1 {RATIO} (ref 1.4–2.6)
ALP SERPL-CCNC: 73 U/L (ref 56–155)
ALT SERPL-CCNC: 7 U/L (ref 10–40)
ANION GAP SERPL CALC-SCNC: 20 MMOL/L (ref 8–19)
AST SERPL-CCNC: 13 U/L (ref 15–50)
BASOPHILS # BLD AUTO: 0.06 10*3/UL (ref 0–0.2)
BASOPHILS NFR BLD AUTO: 0.7 % (ref 0–3)
BILIRUB SERPL-MCNC: 0.29 MG/DL (ref 0.2–1.3)
BUN SERPL-MCNC: 18 MG/DL (ref 5–25)
BUN/CREAT SERPL: 18 {RATIO} (ref 6–20)
CALCIUM SERPL-MCNC: 10 MG/DL (ref 8.7–10.4)
CHLORIDE SERPL-SCNC: 99 MMOL/L (ref 99–111)
CONV ABS IMM GRAN: 0.03 10*3/UL (ref 0–0.2)
CONV CO2: 21 MMOL/L (ref 22–32)
CONV IMMATURE GRAN: 0.3 % (ref 0–1.8)
CONV TOTAL PROTEIN: 7.8 G/DL (ref 6.3–8.2)
CREAT UR-MCNC: 1.01 MG/DL (ref 0.7–1.2)
DEPRECATED RDW RBC AUTO: 43.9 FL (ref 35.1–43.9)
EOSINOPHIL # BLD AUTO: 0.19 10*3/UL (ref 0–0.7)
EOSINOPHIL # BLD AUTO: 2.1 % (ref 0–7)
ERYTHROCYTE [DISTWIDTH] IN BLOOD BY AUTOMATED COUNT: 12.8 % (ref 11.6–14.4)
GFR SERPLBLD BASED ON 1.73 SQ M-ARVRAT: >60 ML/MIN/{1.73_M2}
GLOBULIN UR ELPH-MCNC: 4 G/DL (ref 2–3.5)
GLUCOSE SERPL-MCNC: 177 MG/DL (ref 70–99)
HCT VFR BLD AUTO: 39.3 % (ref 42–52)
HGB BLD-MCNC: 12.2 G/DL (ref 14–18)
LYMPHOCYTES # BLD AUTO: 1.19 10*3/UL (ref 1–5)
LYMPHOCYTES NFR BLD AUTO: 13.1 % (ref 20–45)
MCH RBC QN AUTO: 29 PG (ref 27–31)
MCHC RBC AUTO-ENTMCNC: 31 G/DL (ref 33–37)
MCV RBC AUTO: 93.3 FL (ref 80–96)
MONOCYTES # BLD AUTO: 0.98 10*3/UL (ref 0.2–1.2)
MONOCYTES NFR BLD AUTO: 10.8 % (ref 3–10)
NEUTROPHILS # BLD AUTO: 6.6 10*3/UL (ref 2–8)
NEUTROPHILS NFR BLD AUTO: 73 % (ref 30–85)
NRBC CBCN: 0 % (ref 0–0.7)
OSMOLALITY SERPL CALC.SUM OF ELEC: 288 MOSM/KG (ref 273–304)
PLATELET # BLD AUTO: 277 10*3/UL (ref 130–400)
PMV BLD AUTO: 9.9 FL (ref 9.4–12.4)
POTASSIUM SERPL-SCNC: 4.2 MMOL/L (ref 3.5–5.3)
RBC # BLD AUTO: 4.21 10*6/UL (ref 4.7–6.1)
SODIUM SERPL-SCNC: 136 MMOL/L (ref 135–147)
WBC # BLD AUTO: 9.05 10*3/UL (ref 4.8–10.8)

## 2020-02-03 ENCOUNTER — HOSPITAL ENCOUNTER (OUTPATIENT)
Dept: ONCOLOGY | Facility: HOSPITAL | Age: 85
Discharge: HOME | End: 2020-02-03
Attending: INTERNAL MEDICINE

## 2020-02-03 ENCOUNTER — OFFICE VISIT CONVERTED (OUTPATIENT)
Dept: ONCOLOGY | Facility: HOSPITAL | Age: 85
End: 2020-02-03
Attending: INTERNAL MEDICINE

## 2020-06-07 ENCOUNTER — HOSPITAL ENCOUNTER (OUTPATIENT)
Dept: OTHER | Facility: HOSPITAL | Age: 85
Discharge: HOME | End: 2020-06-07
Attending: INTERNAL MEDICINE

## 2020-06-07 LAB
ALBUMIN SERPL-MCNC: 4.1 G/DL (ref 3.5–5)
ALBUMIN/GLOB SERPL: 1 {RATIO} (ref 1.4–2.6)
ALP SERPL-CCNC: 87 U/L (ref 56–155)
ALT SERPL-CCNC: 11 U/L (ref 10–40)
ANION GAP SERPL CALC-SCNC: 16 MMOL/L (ref 8–19)
AST SERPL-CCNC: 19 U/L (ref 15–50)
BASOPHILS # BLD AUTO: 0.06 10*3/UL (ref 0–0.2)
BASOPHILS NFR BLD AUTO: 0.9 % (ref 0–3)
BILIRUB SERPL-MCNC: 0.21 MG/DL (ref 0.2–1.3)
BUN SERPL-MCNC: 23 MG/DL (ref 5–25)
BUN/CREAT SERPL: 21 {RATIO} (ref 6–20)
CALCIUM SERPL-MCNC: 10.3 MG/DL (ref 8.7–10.4)
CHLORIDE SERPL-SCNC: 101 MMOL/L (ref 99–111)
CHOLEST SERPL-MCNC: 215 MG/DL (ref 107–200)
CHOLEST/HDLC SERPL: 4.1 {RATIO} (ref 3–6)
CONV ABS IMM GRAN: 0.02 10*3/UL (ref 0–0.2)
CONV CO2: 25 MMOL/L (ref 22–32)
CONV IMMATURE GRAN: 0.3 % (ref 0–1.8)
CONV TOTAL PROTEIN: 8.2 G/DL (ref 6.3–8.2)
CREAT UR-MCNC: 1.1 MG/DL (ref 0.7–1.2)
DEPRECATED RDW RBC AUTO: 51.8 FL (ref 35.1–43.9)
EOSINOPHIL # BLD AUTO: 0.14 10*3/UL (ref 0–0.7)
EOSINOPHIL # BLD AUTO: 2 % (ref 0–7)
ERYTHROCYTE [DISTWIDTH] IN BLOOD BY AUTOMATED COUNT: 15.7 % (ref 11.6–14.4)
EST. AVERAGE GLUCOSE BLD GHB EST-MCNC: 140 MG/DL
GFR SERPLBLD BASED ON 1.73 SQ M-ARVRAT: >60 ML/MIN/{1.73_M2}
GLOBULIN UR ELPH-MCNC: 4.1 G/DL (ref 2–3.5)
GLUCOSE SERPL-MCNC: 181 MG/DL (ref 70–99)
HBA1C MFR BLD: 6.5 % (ref 3.5–5.7)
HCT VFR BLD AUTO: 45.3 % (ref 42–52)
HDLC SERPL-MCNC: 53 MG/DL (ref 40–60)
HGB BLD-MCNC: 13.9 G/DL (ref 14–18)
LDLC SERPL CALC-MCNC: 86 MG/DL (ref 70–100)
LYMPHOCYTES # BLD AUTO: 1.32 10*3/UL (ref 1–5)
LYMPHOCYTES NFR BLD AUTO: 19.2 % (ref 20–45)
MCH RBC QN AUTO: 27.3 PG (ref 27–31)
MCHC RBC AUTO-ENTMCNC: 30.7 G/DL (ref 33–37)
MCV RBC AUTO: 88.8 FL (ref 80–96)
MONOCYTES # BLD AUTO: 0.48 10*3/UL (ref 0.2–1.2)
MONOCYTES NFR BLD AUTO: 7 % (ref 3–10)
NEUTROPHILS # BLD AUTO: 4.86 10*3/UL (ref 2–8)
NEUTROPHILS NFR BLD AUTO: 70.6 % (ref 30–85)
NRBC CBCN: 0 % (ref 0–0.7)
OSMOLALITY SERPL CALC.SUM OF ELEC: 292 MOSM/KG (ref 273–304)
PLATELET # BLD AUTO: 280 10*3/UL (ref 130–400)
PMV BLD AUTO: 10.2 FL (ref 9.4–12.4)
POTASSIUM SERPL-SCNC: 5 MMOL/L (ref 3.5–5.3)
RBC # BLD AUTO: 5.1 10*6/UL (ref 4.7–6.1)
SODIUM SERPL-SCNC: 137 MMOL/L (ref 135–147)
T4 FREE SERPL-MCNC: 0.9 NG/DL (ref 0.9–1.8)
TRIGL SERPL-MCNC: 382 MG/DL (ref 40–150)
TSH SERPL-ACNC: 2.37 M[IU]/L (ref 0.27–4.2)
VLDLC SERPL-MCNC: 76 MG/DL (ref 5–37)
WBC # BLD AUTO: 6.88 10*3/UL (ref 4.8–10.8)

## 2020-06-11 ENCOUNTER — HOSPITAL ENCOUNTER (OUTPATIENT)
Dept: CT IMAGING | Facility: HOSPITAL | Age: 85
Discharge: HOME | End: 2020-06-11
Attending: INTERNAL MEDICINE

## 2020-06-11 LAB
ALBUMIN SERPL-MCNC: 4.4 G/DL (ref 3.5–5)
ALBUMIN/GLOB SERPL: 1.1 {RATIO} (ref 1.4–2.6)
ALP SERPL-CCNC: 86 U/L (ref 56–155)
ALT SERPL-CCNC: 16 U/L (ref 10–40)
ANION GAP SERPL CALC-SCNC: 25 MMOL/L (ref 8–19)
AST SERPL-CCNC: 31 U/L (ref 15–50)
BASOPHILS # BLD AUTO: 0.05 10*3/UL (ref 0–0.2)
BASOPHILS NFR BLD AUTO: 0.6 % (ref 0–3)
BILIRUB SERPL-MCNC: 0.24 MG/DL (ref 0.2–1.3)
BUN SERPL-MCNC: 24 MG/DL (ref 5–25)
BUN/CREAT SERPL: 21 {RATIO} (ref 6–20)
CALCIUM SERPL-MCNC: 10.6 MG/DL (ref 8.7–10.4)
CHLORIDE SERPL-SCNC: 104 MMOL/L (ref 99–111)
CONV ABS IMM GRAN: 0.02 10*3/UL (ref 0–0.2)
CONV CO2: 20 MMOL/L (ref 22–32)
CONV IMMATURE GRAN: 0.2 % (ref 0–1.8)
CONV TOTAL PROTEIN: 8.5 G/DL (ref 6.3–8.2)
CREAT UR-MCNC: 1.14 MG/DL (ref 0.7–1.2)
DEPRECATED RDW RBC AUTO: 52 FL (ref 35.1–43.9)
EOSINOPHIL # BLD AUTO: 0.17 10*3/UL (ref 0–0.7)
EOSINOPHIL # BLD AUTO: 2 % (ref 0–7)
ERYTHROCYTE [DISTWIDTH] IN BLOOD BY AUTOMATED COUNT: 15.9 % (ref 11.6–14.4)
GFR SERPLBLD BASED ON 1.73 SQ M-ARVRAT: 58 ML/MIN/{1.73_M2}
GLOBULIN UR ELPH-MCNC: 4.1 G/DL (ref 2–3.5)
GLUCOSE SERPL-MCNC: 79 MG/DL (ref 70–99)
HCT VFR BLD AUTO: 45.8 % (ref 42–52)
HGB BLD-MCNC: 14.2 G/DL (ref 14–18)
LDH SERPL-CCNC: 166 U/L (ref 120–240)
LYMPHOCYTES # BLD AUTO: 1.66 10*3/UL (ref 1–5)
LYMPHOCYTES NFR BLD AUTO: 19.1 % (ref 20–45)
MCH RBC QN AUTO: 27.6 PG (ref 27–31)
MCHC RBC AUTO-ENTMCNC: 31 G/DL (ref 33–37)
MCV RBC AUTO: 89.1 FL (ref 80–96)
MONOCYTES # BLD AUTO: 0.77 10*3/UL (ref 0.2–1.2)
MONOCYTES NFR BLD AUTO: 8.9 % (ref 3–10)
NEUTROPHILS # BLD AUTO: 6.01 10*3/UL (ref 2–8)
NEUTROPHILS NFR BLD AUTO: 69.2 % (ref 30–85)
NRBC CBCN: 0 % (ref 0–0.7)
OSMOLALITY SERPL CALC.SUM OF ELEC: 299 MOSM/KG (ref 273–304)
PLATELET # BLD AUTO: 240 10*3/UL (ref 130–400)
PMV BLD AUTO: 9.9 FL (ref 9.4–12.4)
POTASSIUM SERPL-SCNC: 5.5 MMOL/L (ref 3.5–5.3)
RBC # BLD AUTO: 5.14 10*6/UL (ref 4.7–6.1)
SODIUM SERPL-SCNC: 143 MMOL/L (ref 135–147)
WBC # BLD AUTO: 8.68 10*3/UL (ref 4.8–10.8)

## 2020-06-17 ENCOUNTER — OFFICE VISIT CONVERTED (OUTPATIENT)
Dept: ONCOLOGY | Facility: HOSPITAL | Age: 85
End: 2020-06-17
Attending: INTERNAL MEDICINE

## 2020-06-17 ENCOUNTER — HOSPITAL ENCOUNTER (OUTPATIENT)
Dept: ONCOLOGY | Facility: HOSPITAL | Age: 85
Discharge: HOME | End: 2020-06-17
Attending: INTERNAL MEDICINE

## 2020-09-15 ENCOUNTER — HOSPITAL ENCOUNTER (OUTPATIENT)
Dept: OTHER | Facility: HOSPITAL | Age: 85
Discharge: HOME | End: 2020-09-15

## 2020-09-15 LAB
25(OH)D3 SERPL-MCNC: 25.3 NG/ML (ref 30–100)
ALBUMIN SERPL-MCNC: 3.3 G/DL (ref 3.5–5)
ALBUMIN/GLOB SERPL: 0.8 {RATIO} (ref 1.4–2.6)
ALP SERPL-CCNC: 118 U/L (ref 56–155)
ALT SERPL-CCNC: 24 U/L (ref 10–40)
ANION GAP SERPL CALC-SCNC: 16 MMOL/L (ref 8–19)
AST SERPL-CCNC: 35 U/L (ref 15–50)
BASOPHILS # BLD AUTO: 0.05 10*3/UL (ref 0–0.2)
BASOPHILS NFR BLD AUTO: 0.6 % (ref 0–3)
BILIRUB SERPL-MCNC: 0.37 MG/DL (ref 0.2–1.3)
BUN SERPL-MCNC: 17 MG/DL (ref 5–25)
BUN/CREAT SERPL: 19 {RATIO} (ref 6–20)
CALCIUM SERPL-MCNC: 10 MG/DL (ref 8.7–10.4)
CHLORIDE SERPL-SCNC: 98 MMOL/L (ref 99–111)
CONV ABS IMM GRAN: 0.05 10*3/UL (ref 0–0.2)
CONV CO2: 26 MMOL/L (ref 22–32)
CONV IMMATURE GRAN: 0.6 % (ref 0–1.8)
CONV TOTAL PROTEIN: 7.2 G/DL (ref 6.3–8.2)
CREAT UR-MCNC: 0.91 MG/DL (ref 0.7–1.2)
DEPRECATED RDW RBC AUTO: 46.5 FL (ref 35.1–43.9)
EOSINOPHIL # BLD AUTO: 0.2 10*3/UL (ref 0–0.7)
EOSINOPHIL # BLD AUTO: 2.4 % (ref 0–7)
ERYTHROCYTE [DISTWIDTH] IN BLOOD BY AUTOMATED COUNT: 13.8 % (ref 11.6–14.4)
GFR SERPLBLD BASED ON 1.73 SQ M-ARVRAT: >60 ML/MIN/{1.73_M2}
GLOBULIN UR ELPH-MCNC: 3.9 G/DL (ref 2–3.5)
GLUCOSE SERPL-MCNC: 144 MG/DL (ref 70–99)
HCT VFR BLD AUTO: 40.2 % (ref 42–52)
HGB BLD-MCNC: 12.7 G/DL (ref 14–18)
LYMPHOCYTES # BLD AUTO: 1.08 10*3/UL (ref 1–5)
LYMPHOCYTES NFR BLD AUTO: 12.9 % (ref 20–45)
MCH RBC QN AUTO: 28.7 PG (ref 27–31)
MCHC RBC AUTO-ENTMCNC: 31.6 G/DL (ref 33–37)
MCV RBC AUTO: 91 FL (ref 80–96)
MONOCYTES # BLD AUTO: 0.8 10*3/UL (ref 0.2–1.2)
MONOCYTES NFR BLD AUTO: 9.6 % (ref 3–10)
NEUTROPHILS # BLD AUTO: 6.18 10*3/UL (ref 2–8)
NEUTROPHILS NFR BLD AUTO: 73.9 % (ref 30–85)
NRBC CBCN: 0 % (ref 0–0.7)
OSMOLALITY SERPL CALC.SUM OF ELEC: 284 MOSM/KG (ref 273–304)
PLATELET # BLD AUTO: 482 10*3/UL (ref 130–400)
PMV BLD AUTO: 9.6 FL (ref 9.4–12.4)
POTASSIUM SERPL-SCNC: 4.5 MMOL/L (ref 3.5–5.3)
RBC # BLD AUTO: 4.42 10*6/UL (ref 4.7–6.1)
SODIUM SERPL-SCNC: 135 MMOL/L (ref 135–147)
WBC # BLD AUTO: 8.36 10*3/UL (ref 4.8–10.8)

## 2020-09-21 ENCOUNTER — HOSPITAL ENCOUNTER (OUTPATIENT)
Dept: OTHER | Facility: HOSPITAL | Age: 85
Discharge: HOME | End: 2020-09-21

## 2020-09-21 LAB
BASOPHILS # BLD AUTO: 0.08 10*3/UL (ref 0–0.2)
BASOPHILS NFR BLD AUTO: 1.6 % (ref 0–3)
CONV ABS IMM GRAN: 0.02 10*3/UL (ref 0–0.2)
CONV IMMATURE GRAN: 0.4 % (ref 0–1.8)
DEPRECATED RDW RBC AUTO: 47.8 FL (ref 35.1–43.9)
EOSINOPHIL # BLD AUTO: 0.19 10*3/UL (ref 0–0.7)
EOSINOPHIL # BLD AUTO: 3.7 % (ref 0–7)
ERYTHROCYTE [DISTWIDTH] IN BLOOD BY AUTOMATED COUNT: 14.2 % (ref 11.6–14.4)
HCT VFR BLD AUTO: 39.8 % (ref 42–52)
HGB BLD-MCNC: 12.4 G/DL (ref 14–18)
LYMPHOCYTES # BLD AUTO: 1.13 10*3/UL (ref 1–5)
LYMPHOCYTES NFR BLD AUTO: 22.1 % (ref 20–45)
MCH RBC QN AUTO: 28.3 PG (ref 27–31)
MCHC RBC AUTO-ENTMCNC: 31.2 G/DL (ref 33–37)
MCV RBC AUTO: 90.9 FL (ref 80–96)
MONOCYTES # BLD AUTO: 0.78 10*3/UL (ref 0.2–1.2)
MONOCYTES NFR BLD AUTO: 15.3 % (ref 3–10)
NEUTROPHILS # BLD AUTO: 2.91 10*3/UL (ref 2–8)
NEUTROPHILS NFR BLD AUTO: 56.9 % (ref 30–85)
NRBC CBCN: 0 % (ref 0–0.7)
PLATELET # BLD AUTO: 359 10*3/UL (ref 130–400)
PMV BLD AUTO: 9.3 FL (ref 9.4–12.4)
RBC # BLD AUTO: 4.38 10*6/UL (ref 4.7–6.1)
WBC # BLD AUTO: 5.11 10*3/UL (ref 4.8–10.8)

## 2020-10-02 ENCOUNTER — HOSPITAL ENCOUNTER (OUTPATIENT)
Dept: OTHER | Facility: HOSPITAL | Age: 85
Discharge: HOME | End: 2020-10-02
Attending: INTERNAL MEDICINE

## 2020-10-02 LAB
ALBUMIN SERPL-MCNC: 3.7 G/DL (ref 3.5–5)
ALBUMIN/GLOB SERPL: 1 {RATIO} (ref 1.4–2.6)
ALP SERPL-CCNC: 86 U/L (ref 56–155)
ALT SERPL-CCNC: 10 U/L (ref 10–40)
ANION GAP SERPL CALC-SCNC: 20 MMOL/L (ref 8–19)
AST SERPL-CCNC: 22 U/L (ref 15–50)
BASOPHILS # BLD AUTO: 0.06 10*3/UL (ref 0–0.2)
BASOPHILS NFR BLD AUTO: 0.9 % (ref 0–3)
BILIRUB SERPL-MCNC: 0.31 MG/DL (ref 0.2–1.3)
BNP SERPL-MCNC: 636 PG/ML (ref 0–1800)
BUN SERPL-MCNC: 13 MG/DL (ref 5–25)
BUN/CREAT SERPL: 12 {RATIO} (ref 6–20)
CALCIUM SERPL-MCNC: 10 MG/DL (ref 8.7–10.4)
CHLORIDE SERPL-SCNC: 100 MMOL/L (ref 99–111)
CONV ABS IMM GRAN: 0.02 10*3/UL (ref 0–0.2)
CONV CO2: 24 MMOL/L (ref 22–32)
CONV IMMATURE GRAN: 0.3 % (ref 0–1.8)
CONV TOTAL PROTEIN: 7.4 G/DL (ref 6.3–8.2)
CREAT UR-MCNC: 1.09 MG/DL (ref 0.7–1.2)
DEPRECATED RDW RBC AUTO: 50.8 FL (ref 35.1–43.9)
EOSINOPHIL # BLD AUTO: 0.18 10*3/UL (ref 0–0.7)
EOSINOPHIL # BLD AUTO: 2.6 % (ref 0–7)
ERYTHROCYTE [DISTWIDTH] IN BLOOD BY AUTOMATED COUNT: 14.4 % (ref 11.6–14.4)
EST. AVERAGE GLUCOSE BLD GHB EST-MCNC: 123 MG/DL
GFR SERPLBLD BASED ON 1.73 SQ M-ARVRAT: >60 ML/MIN/{1.73_M2}
GLOBULIN UR ELPH-MCNC: 3.7 G/DL (ref 2–3.5)
GLUCOSE SERPL-MCNC: 111 MG/DL (ref 70–99)
HBA1C MFR BLD: 5.9 % (ref 3.5–5.7)
HCT VFR BLD AUTO: 44.8 % (ref 42–52)
HGB BLD-MCNC: 13.2 G/DL (ref 14–18)
LYMPHOCYTES # BLD AUTO: 0.87 10*3/UL (ref 1–5)
LYMPHOCYTES NFR BLD AUTO: 12.5 % (ref 20–45)
MCH RBC QN AUTO: 28.4 PG (ref 27–31)
MCHC RBC AUTO-ENTMCNC: 29.5 G/DL (ref 33–37)
MCV RBC AUTO: 96.3 FL (ref 80–96)
MONOCYTES # BLD AUTO: 0.53 10*3/UL (ref 0.2–1.2)
MONOCYTES NFR BLD AUTO: 7.6 % (ref 3–10)
NEUTROPHILS # BLD AUTO: 5.31 10*3/UL (ref 2–8)
NEUTROPHILS NFR BLD AUTO: 76.1 % (ref 30–85)
NRBC CBCN: 0 % (ref 0–0.7)
OSMOLALITY SERPL CALC.SUM OF ELEC: 289 MOSM/KG (ref 273–304)
PLATELET # BLD AUTO: 247 10*3/UL (ref 130–400)
PMV BLD AUTO: 10.2 FL (ref 9.4–12.4)
POTASSIUM SERPL-SCNC: 4.8 MMOL/L (ref 3.5–5.3)
RBC # BLD AUTO: 4.65 10*6/UL (ref 4.7–6.1)
SODIUM SERPL-SCNC: 139 MMOL/L (ref 135–147)
TSH SERPL-ACNC: 2.21 M[IU]/L (ref 0.27–4.2)
WBC # BLD AUTO: 6.97 10*3/UL (ref 4.8–10.8)

## 2020-12-29 ENCOUNTER — HOSPITAL ENCOUNTER (OUTPATIENT)
Dept: OTHER | Facility: HOSPITAL | Age: 85
Discharge: HOME | End: 2020-12-29
Attending: INTERNAL MEDICINE

## 2020-12-29 LAB
APPEARANCE UR: CLEAR
BILIRUB UR QL: NEGATIVE
COLOR UR: YELLOW
CONV BACTERIA: NEGATIVE
CONV COLLECTION SOURCE (UA): ABNORMAL
CONV HYALINE CASTS IN URINE MICRO: ABNORMAL /[LPF]
CONV UROBILINOGEN IN URINE BY AUTOMATED TEST STRIP: 0.2 {EHRLICHU}/DL (ref 0.1–1)
GLUCOSE UR QL: 500 MG/DL
HGB UR QL STRIP: ABNORMAL
KETONES UR QL STRIP: NEGATIVE MG/DL
LEUKOCYTE ESTERASE UR QL STRIP: NEGATIVE
NITRITE UR QL STRIP: NEGATIVE
PH UR STRIP.AUTO: 6 [PH] (ref 5–8)
PROT UR QL: ABNORMAL MG/DL
RBC #/AREA URNS HPF: ABNORMAL /[HPF]
SP GR UR: 1.02 (ref 1–1.03)
WBC #/AREA URNS HPF: ABNORMAL /[HPF]

## 2020-12-31 LAB — BACTERIA UR CULT: NORMAL

## 2021-05-15 VITALS — WEIGHT: 179 LBS | RESPIRATION RATE: 16 BRPM | BODY MASS INDEX: 26.51 KG/M2 | HEIGHT: 69 IN

## 2021-05-15 VITALS — HEIGHT: 69 IN | WEIGHT: 182 LBS | RESPIRATION RATE: 16 BRPM | BODY MASS INDEX: 26.96 KG/M2

## 2021-05-15 VITALS — HEIGHT: 69 IN | BODY MASS INDEX: 27.14 KG/M2 | WEIGHT: 183.25 LBS | RESPIRATION RATE: 12 BRPM

## 2021-05-16 VITALS
DIASTOLIC BLOOD PRESSURE: 66 MMHG | SYSTOLIC BLOOD PRESSURE: 104 MMHG | WEIGHT: 186 LBS | HEART RATE: 60 BPM | BODY MASS INDEX: 27.55 KG/M2 | HEIGHT: 69 IN

## 2021-05-28 VITALS
DIASTOLIC BLOOD PRESSURE: 60 MMHG | BODY MASS INDEX: 23.9 KG/M2 | HEART RATE: 75 BPM | RESPIRATION RATE: 20 BRPM | RESPIRATION RATE: 18 BRPM | OXYGEN SATURATION: 98 % | TEMPERATURE: 97.3 F | DIASTOLIC BLOOD PRESSURE: 54 MMHG | TEMPERATURE: 97.3 F | SYSTOLIC BLOOD PRESSURE: 117 MMHG | BODY MASS INDEX: 26.34 KG/M2 | SYSTOLIC BLOOD PRESSURE: 119 MMHG | RESPIRATION RATE: 16 BRPM | RESPIRATION RATE: 18 BRPM | HEART RATE: 75 BPM | OXYGEN SATURATION: 95 % | WEIGHT: 184.97 LBS | HEART RATE: 69 BPM | WEIGHT: 161.82 LBS | WEIGHT: 174.82 LBS | TEMPERATURE: 98.6 F | SYSTOLIC BLOOD PRESSURE: 91 MMHG | TEMPERATURE: 97.2 F | BODY MASS INDEX: 27.31 KG/M2 | OXYGEN SATURATION: 95 % | WEIGHT: 178.35 LBS | RESPIRATION RATE: 18 BRPM | TEMPERATURE: 97.5 F | BODY MASS INDEX: 25.82 KG/M2 | TEMPERATURE: 97.2 F | BODY MASS INDEX: 26.89 KG/M2 | DIASTOLIC BLOOD PRESSURE: 56 MMHG | OXYGEN SATURATION: 96 % | WEIGHT: 182.1 LBS | DIASTOLIC BLOOD PRESSURE: 61 MMHG | OXYGEN SATURATION: 95 % | SYSTOLIC BLOOD PRESSURE: 114 MMHG | HEART RATE: 95 BPM | HEART RATE: 73 BPM | OXYGEN SATURATION: 98 % | SYSTOLIC BLOOD PRESSURE: 119 MMHG | WEIGHT: 181.66 LBS | DIASTOLIC BLOOD PRESSURE: 60 MMHG | SYSTOLIC BLOOD PRESSURE: 117 MMHG | RESPIRATION RATE: 16 BRPM | BODY MASS INDEX: 26.83 KG/M2 | DIASTOLIC BLOOD PRESSURE: 49 MMHG | HEART RATE: 68 BPM

## 2021-05-28 VITALS
HEART RATE: 66 BPM | SYSTOLIC BLOOD PRESSURE: 123 MMHG | OXYGEN SATURATION: 96 % | WEIGHT: 182 LBS | BODY MASS INDEX: 26.96 KG/M2 | TEMPERATURE: 98.1 F | HEIGHT: 69 IN | RESPIRATION RATE: 16 BRPM | DIASTOLIC BLOOD PRESSURE: 79 MMHG

## 2021-05-28 VITALS
BODY MASS INDEX: 26.78 KG/M2 | SYSTOLIC BLOOD PRESSURE: 121 MMHG | DIASTOLIC BLOOD PRESSURE: 68 MMHG | HEART RATE: 76 BPM | TEMPERATURE: 97.5 F | RESPIRATION RATE: 18 BRPM | HEIGHT: 69 IN | WEIGHT: 180.78 LBS | OXYGEN SATURATION: 97 %

## 2021-05-28 VITALS
WEIGHT: 181.25 LBS | DIASTOLIC BLOOD PRESSURE: 55 MMHG | HEIGHT: 69 IN | SYSTOLIC BLOOD PRESSURE: 130 MMHG | OXYGEN SATURATION: 95 % | TEMPERATURE: 98 F | HEART RATE: 71 BPM | RESPIRATION RATE: 14 BRPM | BODY MASS INDEX: 26.84 KG/M2

## 2021-05-28 NOTE — PROGRESS NOTES
Patient: CRISTI NOVA     Acct: [id number]     Report: #[id number]  UNIT #: [id number]     : 1934    Encounter Date:2019  PRIMARY CARE: Homero Hinojosa  ***Signed***  --------------------------------------------------------------------------------------------------------------------  NURSE INTAKE      Visit Type      Established Patient Visit            Chief Complaint      getting chemo and seeing dr tesha flores      Intent of Therapy:  Curative            Referring Provider/Copies To      Referring Provider:  Terra Hickman      Primary Care Provider:  Homero Hinojosa            History and Present Illness      Past Oncology Illness History      Mr. Nova is an 83yo WM recently dx with LLL cancer.  He reports bouts of     abdominal pain/spells and  to additional diagnostic work up that     included CT scan which demostrated a lesion in the LLL.  PET scan completed on     19 showed LLL mass with small amt uptake in left hilum.  Biopsy taken on     19 returned Squamous lung cancer.  Pt reports no significant wt loss.  He     does recollect a nonproductive cough for last several months; however, no     hemoptysis.        PMH: CABG x5 with 2 stents placed , DM, htn, hyperlipidemia.  Pt quit     smoking at age 51, does not drink ETOH or use illicit drugs.      Family hx:  mother-had lung ca-never smoked, maternal aunt lung ca. Father      from blood clot            HPI - Oncology Interim      F/u left lung ca--due for #3 wkly Carbo/Taxol today-held last wk d/t PO antibx     therapy for diverticulitis--completed antibiotics Evin 9/15/19.  Wt is up     1.3kg-states appetite is good.  He is drinking plenty of fluids and eating a lot    of fruits.  Forearms with several reddened blotches-reports they itch.  No areas    noted beyond shirt sleeve.  No skin breakdown noted.  Denies distress.            Cancer Details            [id number]            Clinical  Staging      Stage IIA ([id number])            Treatments      Chemotherapy      Wkly Carbo/Taxol initiated 8/27/19      Radiation Therapy      Concurrent LLL XRT            Clinical Trial Participant      No            ECOG Performance Status      0            Most Recent Lab Findings      Laboratory Tests      9/8/19 06:55            9/10/19 09:15            PAST, FAMILY   Past Medical History      Past Medical History:  Arthritis, Diabetes Type 2, High Cholesterol, Liver     Disease, Stroke      Other PMH:        CIRRHOSIS      Hematology/Oncology (M):  Lung Cancer, Skin Cancer            Past Surgical History      Cataract Surgery, Cholecystectomy, Coronary Stent, Lung Biopsy, Skin Cancer     Removal            Family History      Family History:  Lung Cancer (SISTER AND MOTHER), Skin Cancer (BROTHER)            Social History      Marital Status:        Lives independently:  Yes      Number of Children:  3      Occupation:  RETIRED            Tobacco Use      Tobacco status:  Former smoker (1.5-2 ppd x 35y )            Alcohol Use      Alcohol intake:  None            Substance Use      Substance use:  Denies use            REVIEW OF SYSTEMS      General:  Admits: Fatigue      Eye:  Admits Corrective Lenses      ENT:  Admits Headache, Admits Hearing Loss      Cardiovascular:  Denies Chest Pain      Respiratory:  Admits: Productive Cough, Shortness of Air      Gastrointestinal:  Admits Nausea/Vomiting      Integumentary:  Admits Itching, Admits Rash      Neurologic:  Admits Numbness\Tingling            VITAL SIGNS AND SCORES      Vitals      Weight 184 lbs 15.455 oz / 83.9 kg      Temperature 97.3 F / 36.28 C - Temporal      Pulse 73      Respirations 20      Blood Pressure 114/60 Sitting      Pulse Oximetry 96%, RM AIR            Pain Score      Pain Scale Used:  Numerical      Pain Intensity:  2            Fatigue Score      Fatigue (0-10 scale):  4            EXAM      General Appearance:  Positive for:  Alert, Oriented x3, Cooperative;          Negative for: Acute Distress      Neck:  Positive for: Supple;          Negative for: JVD, Masses      Respiratory:  Positive for: CTAB, Normal Respiratory Effort      Abdomen/Gastro:  Positive for: Normal Active Bowel Sounds, Soft;          Negative for: Distention, Hepatosplenomegaly, Tenderness      Cardiovascular:  Positive for: RRR;          Negative for: Gallop, Murmur, Peripheral Edema, Rub      Psychiatric:  Positive for: Appropriate Affect, Intact Judgement      Lymphatic:  Negative for: Cervical, Infraclavicular, Supraclavicular            PREVENTION      Hx Influenza Vaccination:  Yes      Date Influenza Vaccine Given:  Oct 1, 2018      Influenza Vaccine Declined:  No      2 or More Falls Past Year?:  No      Fall Past Year with Injury?:  No      Hx Pneumococcal Vaccination:  Yes      Encouraged to follow-up with:  PCP regarding preventative exams.      Chart initiated by      ASHLEY CIFUENTES MA            ALLERGY/MEDS      Allergies      Coded Allergies:             NO KNOWN DRUG ALLERGIES (Verified  Allergy, Unknown, 9/17/19)            Medications      Last Reconciled on 9/17/19 09:27 by JAY SWIFT      Benzonatate (Tessalon Perles) 100 Mg Cap      100 MG PO TID PRN for COUGH for 30 Days, #90 CAP 3 Refills         Prov: JUAN PABLO WEINSTEIN         9/3/19       Hydrocodone/Acetaminophen 5/325 MG (Hydrocodone/Acetaminophen 5/325 MG) 1 Each     Tablet      1-2 TAB PO Q4H for Post Surgical PAin, #8 TAB 0 Refills         Prov: Dalton Randall         8/26/19       Prochlorperazine Maleate (Prochlorperazine Maleate) 10 Mg Tab      10 MG PO Q6H PRN for NAUSEA AND/OR VOMITING for 30 Days, #90 TAB 4 Refills         Prov: JUAN PABLO WEINSTEIN         8/6/19       Ondansetron Hcl (ONDANSETRON HCL) 8 Mg Tablet      8 MG PO Q8H PRN for NAUSEA, #60 TAB 3 Refills         Prov: JUAN PABLO WEINSTEIN         8/6/19       Insulin Degludec (Tresiba Flextouch U-200) 200 Unit/1 Ml Insuln.pen       80 UNITS SUBQ QDAY for 30 Days, #1 INSULN.PEN         Reported         8/2/19       (Fluticasone)   No Conflict Check      50 MG INH QDAY         Reported         8/2/19       Meclizine Hcl (Meclizine*) 12.5 Mg Tablet      12.5 MG PO TID PRN for VERTIGO, #90 TAB 0 Refills         Reported         8/2/19       Loratadine (Loratadine) 10 Mg Tablet      10 MG PO HS, #30 TAB 0 Refills         Reported         8/2/19       Clotrimazole (Lotrimin 1% Cream) 15 Gm Cream..g.      1 APL TOPICAL BID, #30 GM         Prov: DIANA BARAJAS CFR         4/2/19       Metoprolol Succinate (Metoprolol Succinate) 25 Mg Tab.er.24h      25 MG PO HS         Reported         10/30/18       Losartan Potassium (Losartan*) 25 Mg Tablet      12.5 MG PO QDAY, #30 TAB 0 Refills         Reported         10/30/18       Rosuvastatin Calcium (Crestor*) 20 Mg Tablet      20 MG PO HS         Reported         10/30/18       Citalopram HBr (Citalopram HBr) 20 Mg Tablet      20 MG PO QDAY         Reported         10/30/18       QUEtiapine (QUEtiapine) 25 Mg Tablet      25 MG PO BID         Reported         10/30/18       Ranitidine HCl (Ranitidine HCl) 150 Mg Tablet      150 MG PO QDAY, #30 TAB 0 Refills         Reported         10/30/18       Metformin Hcl* (Metformin Hcl*) 850 Mg Tablet      850 MG PO BID         Reported         10/30/18       Clopidogrel Bisulfate (Plavix) 75 Mg Tablet      75 MG PO QDAY         Reported         7/9/07      Medications Reviewed:  No Changes made to meds            IMPRESSION/PLAN      Diagnosis      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  He was held last week due to diverticulitis.      He has not completed a course of antibiotics.  I will plan to resume therapy     today with weekly carboplatin and Taxol.  Continue radiation as per radiation     oncology.  RTC 1 week for OV, chemo with labs prior            Sun exposure, moderate         Moderate sun exposure,  initial encounter - X32.XXXA         Encounter type: initial encounter      Patient appears to have some areas of skin irritation likely related to sun     exposure as they are only on the forearms.  We discussed sunscreen or keeping     the area covered.  Moisturizing lotion and anti-itch cream can be applied as     needed            Patient Education            Play It Safe in the Sun      Protect Your Skin: How to Avoid Sun Exposure      Sun Protection From Your Clothes?      Patient Education Provided:  Yes                 Disclaimer: Converted document may not contain table formatting or lab diagrams. Please see [employer] for the authenticated document.

## 2021-05-28 NOTE — PROGRESS NOTES
Patient: CRISTI NOVA     Acct: [id number]     Report: #[id number]  UNIT #: [id number]     : 1934    Encounter Date:09/10/2019  PRIMARY CARE: Homero Hinojosa  ***Signed***  --------------------------------------------------------------------------------------------------------------------  NURSE INTAKE      Visit Type      Established Patient Visit            Chief Complaint      WEEKLY VISIT BEFORE CHEMO.      Intent of Therapy:  Curative            Referring Provider/Copies To      Referring Provider:  Terra Hickman      Primary Care Provider:  Homero Hinojosa            History and Present Illness      Past Oncology Illness History      Mr. Nova is an 83yo WM recently dx with LLL cancer.  He reports bouts of     abdominal pain/spells and  to additional diagnostic work up that     included CT scan which demostrated a lesion in the LLL.  PET scan completed on     19 showed LLL mass with small amt uptake in left hilum.  Biopsy taken on     19 returned Squamous lung cancer.  Pt reports no significant wt loss.  He     does recollect a nonproductive cough for last several months; however, no     hemoptysis.        PMH: CABG x5 with 2 stents placed , DM, htn, hyperlipidemia.  Pt quit     smoking at age 51, does not drink ETOH or use illicit drugs.      Family hx:  mother-had lung ca-never smoked, maternal aunt lung ca. Father      from blood clot            HPI - Oncology Interim      F/u lt lung ca-due for wk 3 of chemo; however, seen in ED 19 and dx of acute    uncomplicated diverticulitis--prescribed Flagyl and Cipro.  Pt reports taking     antbx w/o issues.  He reports subjective fever but improving. He no longer has     diarrhea; denies hematochezia.  He is trying to eat and drink; however, unsure     if adequate.  Daughter reports he did have some dizziness this am; using walker     for assistance.  No distress noted at this time.            Cancer Details             [id number]            Clinical Staging      Stage IIA ([id number])            Treatments      Chemotherapy      Wkly Carbo/Taxol initiated 8/27/19      Radiation Therapy      Concurrent LLL XRT            Clinical Trial Participant      No            ECOG Performance Status      0            Most Recent Lab Findings      Laboratory Tests      9/8/19 06:55            PAST, FAMILY   Past Medical History      Past Medical History:  Arthritis, Diabetes Type 2, High Cholesterol, Liver     Disease, Stroke      Other PMH:        CIRRHOSIS      Hematology/Oncology (M):  Lung Cancer, Skin Cancer            Past Surgical History      Cataract Surgery, Cholecystectomy, Coronary Stent, Lung Biopsy, Skin Cancer     Removal            Family History      Family History:  Lung Cancer (SISTER AND MOTHER), Skin Cancer (BROTHER)            Social History      Marital Status:        Lives independently:  Yes      Number of Children:  3      Occupation:  RETIRED            Tobacco Use      Tobacco status:  Former smoker (1.5-2 ppd x 35y )            Alcohol Use      Alcohol intake:  None            Substance Use      Substance use:  Denies use            REVIEW OF SYSTEMS      General:  Admits: Fatigue      Eye:  Admits Corrective Lenses      ENT:  Admits Hearing Loss      Cardiovascular:  Denies Chest Pain      Respiratory:  Denies: Shortness of Air      Gastrointestinal:  Admits Diarrhea, Admits Nausea/Vomiting      Musculoskeletal:  Denies Muscle Pain      Neurologic:  Admits Dizziness; Denies Numbness\Tingling            VITAL SIGNS AND SCORES      Vitals      Weight 182 lbs 1.599 oz / 82.6 kg      Temperature 97.5 F / 36.39 C - Temporal      Pulse 75      Respirations 16      Blood Pressure 117/54 Sitting      Pulse Oximetry 98%, RM AIR            Pain Score      Pain Scale Used:  Numerical      Pain Intensity:  0            Fatigue Score      Fatigue (0-10 scale):  4            EXAM      General Appearance:   Positive for: Alert, Oriented x3, Cooperative;          Negative for: Acute Distress      Neck:  Positive for: Supple;          Negative for: JVD, Masses      Respiratory:  Positive for: CTAB, Normal Respiratory Effort      Chest:  Port in Place      Abdomen/Gastro:  Positive for: Normal Active Bowel Sounds, Soft;          Negative for: Distention, Hepatosplenomegaly, Tenderness      Cardiovascular:  Positive for: RRR;          Negative for: Gallop, Murmur, Peripheral Edema, Rub      Psychiatric:  Positive for: Appropriate Affect, Intact Judgement            PREVENTION      Hx Influenza Vaccination:  Yes      Date Influenza Vaccine Given:  Oct 1, 2018      Influenza Vaccine Declined:  No      2 or More Falls Past Year?:  No      Fall Past Year with Injury?:  No      Hx Pneumococcal Vaccination:  Yes      Encouraged to follow-up with:  PCP regarding preventative exams.      Chart initiated by      ASHLEY CIFUENTES MA            ALLERGY/MEDS      Allergies      Coded Allergies:             NO KNOWN DRUG ALLERGIES (Verified  Allergy, Unknown, 9/10/19)            Medications      Last Reconciled on 9/10/19 09:50 by JAY SWIFT      Metronidazole (Flagyl*) 500 Mg Tablet      500 MG PO BID, TAB 0 Refills         Reported         9/10/19       Ciprofloxacin HCl (Cipro) 500 Mg Tablet      500 MG PO BID, TAB 0 Refills         Reported         9/10/19       Benzonatate (Tessalon Perles) 100 Mg Cap      100 MG PO TID PRN for COUGH for 30 Days, #90 CAP 3 Refills         Prov: JUAN PABLO WEINSTEIN         9/3/19       Hydrocodone/Acetaminophen 5/325 MG (Hydrocodone/Acetaminophen 5/325 MG) 1 Each     Tablet      1-2 TAB PO Q4H for Post Surgical PAin, #8 TAB 0 Refills         Prov: Dalton Randall         8/26/19       Prochlorperazine Maleate (Prochlorperazine Maleate) 10 Mg Tab      10 MG PO Q6H PRN for NAUSEA AND/OR VOMITING for 30 Days, #90 TAB 4 Refills         Prov: JUAN PABLO WEINSTEIN         8/6/19       Ondansetron Hcl  (ONDANSETRON HCL) 8 Mg Tablet      8 MG PO Q8H PRN for NAUSEA, #60 TAB 3 Refills         Prov: JUAN PABLO WEINSTEIN         8/6/19       Insulin Degludec (Tresiba Flextouch U-200) 200 Unit/1 Ml Insuln.pen      80 UNITS SUBQ QDAY for 30 Days, #1 INSULN.PEN         Reported         8/2/19       (Fluticasone)   No Conflict Check      50 MG INH QDAY         Reported         8/2/19       Meclizine Hcl (Meclizine*) 12.5 Mg Tablet      12.5 MG PO TID PRN for VERTIGO, #90 TAB 0 Refills         Reported         8/2/19       Loratadine (Loratadine) 10 Mg Tablet      10 MG PO HS, #30 TAB 0 Refills         Reported         8/2/19       Clotrimazole (Lotrimin 1% Cream) 15 Gm Cream..g.      1 APL TOPICAL BID, #30 GM         Prov: DIANA BARAJAS [facility]         4/2/19       Metoprolol Succinate (Metoprolol Succinate) 25 Mg Tab.er.24h      25 MG PO HS         Reported         10/30/18       Losartan Potassium (Losartan*) 25 Mg Tablet      12.5 MG PO QDAY, #30 TAB 0 Refills         Reported         10/30/18       Rosuvastatin Calcium (Crestor*) 20 Mg Tablet      20 MG PO HS         Reported         10/30/18       Citalopram HBr (Citalopram HBr) 20 Mg Tablet      20 MG PO QDAY         Reported         10/30/18       QUEtiapine (QUEtiapine) 25 Mg Tablet      25 MG PO BID         Reported         10/30/18       Ranitidine HCl (Ranitidine HCl) 150 Mg Tablet      150 MG PO QDAY, #30 TAB 0 Refills         Reported         10/30/18       Metformin Hcl* (Metformin Hcl*) 850 Mg Tablet      850 MG PO BID         Reported         10/30/18       Clopidogrel Bisulfate (Plavix) 75 Mg Tablet      75 MG PO QDAY         Reported         7/9/07      Medications Reviewed:  No Changes made to meds            IMPRESSION/PLAN      Diagnosis      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  He is due for his next weekly cycle of     carboplatin and Taxol.  Diagnosed with acute uncomplicated diverticulitis over     the  weekend.  He is now on antibiotics.  I will hold chemotherapy this week to     allow him to get over the infection.  I will see him back next week to reassess     with labs prior.  If he is feeling better.  Resume chemotherapy.  Continue     radiation as per radiation oncology.            Dehydration symptoms - R63.8      Patient will receive intravenous fluids today.  These can be repeated daily     until his volume intake is adequate            Diverticulitis - K57.92      Patient recently started on Cipro and Flagyl.  Clinically doing better although     he still reports subjective fever.  Finish course of antibiotics            Patient Education            Hydration Solution for Injection      Patient Education Provided:  Yes            Topics Patient Counseled on      Hold chemotherapy x 1 wk                 Disclaimer: Converted document may not contain table formatting or lab diagrams. Please see [employer] for the authenticated document.

## 2021-05-28 NOTE — PROGRESS NOTES
"Patient: CRISTI NOVA     Acct: [id number]     Report: #[id number]  UNIT #: [id number]     : 1934    Encounter Date:2019  PRIMARY CARE: Homero Hinojosa  ***Signed***  --------------------------------------------------------------------------------------------------------------------  NURSE INTAKE      Visit Type      Established Patient Visit            Chief Complaint      for tx and to see       Intent of Therapy:  Curative            Referring Provider/Copies To      Referring Provider:  Terra Hickman      Primary Care Provider:  Homero Hinojosa            History and Present Illness      Past Oncology Illness History      Mr. Nova is an 83yo WM recently dx with LLL cancer.  He reports bouts of     abdominal pain/spells and  to additional diagnostic work up that i    ncluded CT scan which demostrated a lesion in the LLL.  PET scan completed on     19 showed LLL mass with small amt uptake in left hilum.  Biopsy taken on     19 returned Squamous lung cancer.  Pt reports no significant wt loss.  He     does recollect a nonproductive cough for last several months; however, no     hemoptysis.        PMH: CABG x5 with 2 stents placed , DM, htn, hyperlipidemia.  Pt quit smok    ing at age 51, does not drink ETOH or use illicit drugs.      Family hx:  mother-had lung ca-never smoked, maternal aunt lung ca. Father      from blood clot            HPI - Oncology Interim      F/u left lung ca--due for wk 2 chemo/radiation.  Only complaint is cough--using     cough drops but at times they do not manage.  He denies other changes of resp     status.  He tolerated 1st wk treatment-denied n/v-states he got a little     \"whoosie\" but did not feel bad.  He reports good appetite; wt is stable.  Port     at rt chest w/o issues.  No distress noted.            Cancer Details            [id number]            Clinical Staging      Stage IIA ([id number])          "   Treatments      Chemotherapy      Wkly Carbo/Taxol initiated 8/27/19      Radiation Therapy      Concurrent LLL XRT            Clinical Trial Participant      No            ECOG Performance Status      0            Most Recent Lab Findings      Laboratory Tests      8/29/19 10:34            PAST, FAMILY   Past Medical History      Past Medical History:  Arthritis, Diabetes Type 2, High Cholesterol, Liver     Disease, Stroke      Other PMH:        CIRRHOSIS      Hematology/Oncology (M):  Lung Cancer, Skin Cancer            Past Surgical History      Cataract Surgery, Cholecystectomy, Coronary Stent, Lung Biopsy, Skin Cancer     Removal            Family History      Family History:  Lung Cancer (SISTER AND MOTHER), Skin Cancer (BROTHER)            Social History      Marital Status:        Lives independently:  Yes      Number of Children:  3      Occupation:  RETIRED            Tobacco Use      Tobacco status:  Former smoker (1.5-2 ppd x 35y )            Alcohol Use      Alcohol intake:  None            Substance Use      Substance use:  Denies use            REVIEW OF SYSTEMS      General:  Admits: Fatigue      Eye:  Admits: Corrective Lenses      ENT:  Admits: Hearing Loss      Cardiovascular:  Denies: Chest Pain      Respiratory:  Denies: Shortness of Air      Gastrointestinal:  Denies: Nausea      Musculoskeletal:  Denies: Muscle Pain      Neurologic:  Admits: Dizziness      Psychiatric:  Denies: Depression      Endocrine:  Admits: Diabetes            VITAL SIGNS AND SCORES      Vitals      Weight 181 lbs 10.545 oz / 82.4 kg      Temperature 97.3 F / 36.28 C - Temporal      Pulse 75      Respirations 18      Blood Pressure 119/60 Sitting      Pulse Oximetry 95%, RM AIR            Pain Score      Pain Scale Used:  Numerical      Pain Intensity:  0            Fatigue Score      Fatigue (0-10 scale):  4            EXAM      General Appearance:  Positive for: Alert, Oriented x3, Cooperative;           Negative for: Acute Distress      Neck:  Positive for: Supple;          Negative for: JVD, Masses      Respiratory:  Positive for: CTAB, Normal Respiratory Effort      Chest:  Port in Place      Abdomen/Gastro:  Positive for: Normal Active Bowel Sounds, Soft;          Negative for: Distention, Hepatosplenomegaly, Tenderness      Cardiovascular:  Positive for: RRR;          Negative for: Gallop, Murmur, Peripheral Edema, Rub      Psychiatric:  Positive for: Appropriate Affect, Intact Judgement      Lymphatic:  Negative for: Cervical, Infraclavicular, Supraclavicular            PREVENTION      Hx Influenza Vaccination:  Yes      Date Influenza Vaccine Given:  Oct 1, 2018      Influenza Vaccine Declined:  No      2 or More Falls Past Year?:  No      Fall Past Year with Injury?:  No      Hx Pneumococcal Vaccination:  Yes      Encouraged to follow-up with:  PCP regarding preventative exams.      Chart initiated by      ASHLEY CIFUENTES MA            ALLERGY/MEDS      Allergies      Coded Allergies:             NO KNOWN DRUG ALLERGIES (Verified  Allergy, Unknown, 9/3/19)            Medications      Last Reconciled on 9/3/19 11:13 by JAY SWIFT      Benzonatate (Tessalon Perles) 100 Mg Cap      100 MG PO TID PRN for COUGH for 30 Days, #90 CAP 3 Refills         Prov: JUAN PABLO WEINSTEIN         9/3/19       Hydrocodone/Acetaminophen 5/325 MG (Hydrocodone/Acetaminophen 5/325 MG) 1 Each     Tablet      1-2 TAB PO Q4H for Post Surgical PAin, #8 TAB 0 Refills         Prov: Dalton Randall         8/26/19       Prochlorperazine Maleate (Prochlorperazine Maleate) 10 Mg Tab      10 MG PO Q6H PRN for NAUSEA AND/OR VOMITING for 30 Days, #90 TAB 4 Refills         Prov: JUAN PABLO WEINSTEIN         8/6/19       Ondansetron Hcl (ONDANSETRON HCL) 8 Mg Tablet      8 MG PO Q8H PRN for NAUSEA, #60 TAB 3 Refills         Prov: JUAN PABLO WEINSTEIN         8/6/19       Insulin Degludec (Tresiba Flextouch U-200) 200 Unit/1 Ml Insuln.pen      80 UNITS  SUBQ QDAY for 30 Days, #1 INSULN.PEN         Reported         8/2/19       (Fluticasone)   No Conflict Check      50 MG INH QDAY         Reported         8/2/19       Meclizine Hcl (Meclizine*) 12.5 Mg Tablet      12.5 MG PO TID PRN for VERTIGO, #90 TAB 0 Refills         Reported         8/2/19       Loratadine (Loratadine) 10 Mg Tablet      10 MG PO HS, #30 TAB 0 Refills         Reported         8/2/19       Clotrimazole (Lotrimin 1% Cream) 15 Gm Cream..g.      1 APL TOPICAL BID, #30 GM         Prov: DIANA BARAJAS CFR         4/2/19       Metoprolol Succinate (Metoprolol Succinate) 25 Mg Tab.er.24h      25 MG PO HS         Reported         10/30/18       Losartan Potassium (Losartan*) 25 Mg Tablet      12.5 MG PO QDAY, #30 TAB 0 Refills         Reported         10/30/18       Rosuvastatin Calcium (Crestor*) 20 Mg Tablet      20 MG PO HS         Reported         10/30/18       Citalopram HBr (Citalopram HBr) 20 Mg Tablet      20 MG PO QDAY         Reported         10/30/18       QUEtiapine (QUEtiapine) 25 Mg Tablet      25 MG PO BID         Reported         10/30/18       Ranitidine Hcl (Ranitidine*) 150 Mg Tablet      150 MG PO QDAY, #30 TAB 0 Refills         Reported         10/30/18       Metformin Hcl* (Metformin Hcl*) 850 Mg Tablet      850 MG PO BID         Reported         10/30/18       Clopidogrel Bisulfate (Plavix) 75 Mg Tablet      75 MG PO QDAY         Reported         7/9/07      Medications Reviewed:  Changes made to meds            IMPRESSION/PLAN      Diagnosis      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  He is due for weekly cycle 2 of car    boplatin and Taxol.  Tolerated his initial cycle well.  Lab work is adequate for     treatment.  Proceed with cycle 2 as planned.  He does report increased cough     and a prescription for Tessalon Perles provided as needed.  Continue radiation     as per radiation oncology.  RTC 1 week for OV, cycle 3 with  labs prior            Notes      New Medications      * Benzonatate (Tessalon Perles) 100 MG CAP: 100 MG PO TID PRN COUGH 30 Days #90            Patient Education            Exercise (Alternative Therapy)      Nutritional Support (General) (Alternative Therapy)      Patient Education Provided:  Yes                 Disclaimer: Converted document may not contain table formatting or lab diagrams. Please see [employer] [facility] for the authenticated document.

## 2021-05-28 NOTE — PROGRESS NOTES
"Patient: CRISTI FRANKS     Acct: [id number]     Report: #[id number]  UNIT #: [id number]     : 1934    Encounter Date:2019  PRIMARY CARE: Homero Hinojosa  ***Signed***  --------------------------------------------------------------------------------------------------------------------  Encounter Date      Aug 13, 2019      LUNG CANCER            History of Present Illness      This is a pleasant 84-year-old male who presents the thoracic surgical clinic     today for evaluation of a clinical stage IIb left lower lobe squamous cell     carcinoma.  He has undergone a work-up including a CT of the abdomen and pelvis     as well as PET/CT and brain MRI.  He is also undergone bronchoscopy with     endobronchial ultrasound for diagnosis and staging.  He does state that for the     bronchoscopy he held his Plavix however has since resumed it and is experiencing    low volume hemoptysis.  He states that this happens a few times throughout the     course of the day and he describes it as \"coughing up clots\".  He otherwise     denies fever, chills, shortness of breath, dyspnea on exertion.  He is quite     active and is able to ambulate however he does state that it is slow.  This is     mainly limited by balance issues and pain.  He is able to time stairs but states    that he cannot quantify how many flights based on the fact that it is been     sometime since he is attempted to do this.            Past surgical history: Coronary artery bypass graft, appendectomy,     cholecystectomy, cataracts            Allergies      Coded Allergies:             NO KNOWN DRUG ALLERGIES (Verified  Allergy, Unknown, 19)            Yes: Angioplasty, Appendectomy (), Bowel Surgery (COLONOSCOPY), CABG (5     VESSEL ), Cholecystectomy (), Head Surgery (HANNA CATARACT REMOVAL), Other    Surgeries      Heart - Family Hx:  Brother      Diabetes - Family Hx:  Brother      Cancer/Type - Family Hx:  Mother " "(lung), Sister (Lung)      Is Father Still Living?:  No      Is Mother Still Living?:  No      Social History:  No Tobacco Use, No Alcohol Use, No Recreational Drug use      Smoking status:  Former smoker (1.5-2 ppd x 35y )      Alcohol intake:  None      Medical History:  Yes: Anemia (in march, 2005; WAS TREATED FOR ANEMIA),     Arthritis (\"MOVES AROUND\" SHOULDERS, BACK AND NECK), Cataracts (REMOVED BOTH     EYES), Chemotherapy/Cancer (MULTIPLE SKIN CANCERS REMOVED), Diverticulitis,     Depression, Anxiety, Diabetes (TYPE II), Seizures, Heart Attack (OPEN HEART     SURGERY-2 STENTS-1998), Hemorrhoids/Rectal Prob (DIVERTICULOSIS, ESOPHAGEAL     STRICTURE W/ DILITATION), Hiatal Hernia, High Blood Pressure (ON MEDS),     Shortness Of Breath (PNEUMONIA); No: Asthma, Blood Disease, Chronic     Bronchitis/COPD, Congestive Heart Failu, Deafness or Ringing Ears, Sinus     Trouble, Miscellaneous Medical/oth            Medications      Last Reconciled on 8/13/19 13:56 by OZ DOAN DO      Prochlorperazine Maleate (Prochlorperazine Maleate) 10 Mg Tab      10 MG PO Q6H PRN for NAUSEA AND/OR VOMITING for 30 Days, #90 TAB 4 Refills         Prov: JUAN PABLO WEINSTEIN         8/6/19       Ondansetron Hcl (ONDANSETRON HCL) 8 Mg Tablet      8 MG PO Q8H PRN for NAUSEA, #60 TAB 3 Refills         Prov: JUAN PABLO WEINSTEIN         8/6/19       Insulin Degludec (Tresiba Flextouch U-200) 200 Unit/1 Ml Insuln.pen      80 UNITS SUBQ QDAY for 30 Days, #1 INSULN.PEN         Reported         8/2/19       (Fluticasone)   No Conflict Check      50 MG INH QDAY         Reported         8/2/19       Meclizine Hcl (Meclizine*) 12.5 Mg Tablet      12.5 MG PO TID PRN for VERTIGO, #90 TAB 0 Refills         Reported         8/2/19       Loratadine (Loratadine) 10 Mg Tablet      10 MG PO HS, #30 TAB 0 Refills         Reported         8/2/19       Clotrimazole (Lotrimin 1% Cream) 15 Gm Cream..g.      1 APL TOPICAL BID, #30 GM         Prov: DIANA BARAJAS CFR    "      4/2/19       Metoprolol Succinate (Metoprolol Succinate) 25 Mg Tab.er.24h      25 MG PO HS         Reported         10/30/18       Losartan Potassium (Losartan*) 25 Mg Tablet      12.5 MG PO QDAY, #30 TAB 0 Refills         Reported         10/30/18       Rosuvastatin Calcium (Crestor*) 20 Mg Tablet      20 MG PO HS         Reported         10/30/18       Citalopram HBr (Citalopram HBr) 20 Mg Tablet      20 MG PO QDAY         Reported         10/30/18       QUEtiapine (QUEtiapine) 25 Mg Tablet      25 MG PO BID         Reported         10/30/18       Ranitidine Hcl (Ranitidine*) 150 Mg Tablet      150 MG PO QDAY, #30 TAB 0 Refills         Reported         10/30/18       Metformin Hcl* (Metformin Hcl*) 850 Mg Tablet      850 MG PO BID         Reported         10/30/18       Clopidogrel Bisulfate (Plavix) 75 Mg Tablet      75 MG PO QDAY         Reported         7/9/07            Vitals      Height 5 ft 9 in / 175.26 cm      Weight 180 lbs 12.435 oz / 82 kg      BSA 1.98 m2      BMI 26.7 kg/m2      Temperature 97.5 F / 36.39 C - Temporal      Pulse 76      Respirations 18      Blood Pressure 121/68 Sitting, Left Arm      Pulse Oximetry 97%, RM AIR            General Appearance:  Alert, Oriented X3      HEENT:  Orophraynx clear, No Erythema, No Exudates      Neck:  Supple, No Masses or JVD      Respiratory:  CTAB      Abdomen:  Soft, No NABS, No Masses, No Hernias, No Hepatosplenomegaly      Cardiovascular:  No Chest Tenderness; Regular Rate and Rhythm, Other (Well-    healed median sternotomy scar)      Lymphatic:  No Neck      Extremeties:  Pulses Positive all 4 Ext      Neurological:  Mental Status WNL      Skin:  No Rash, No Cellulitis      Psychiatric:  Appropriate Affect            Imaging/Interpretation      I personally reviewed the PET/CT:CONCLUSION:         1. 4.1 cm x 3.5 cm in size mass in the anterior superior aspect of the left     lower lobe of the lung       that touches the inferior lateral aspect  of the left pulmonary hilum with     maximum standard uptake       value of 9.01 consistent with cancer.      2. Small tiny dot of increased nuclear PET activity in the left pulmonary hilum     with maximum       standard uptake value of 5.66 is suspicious for very early metastatic adenopat    hy.            MRI brain: No evidence of disseminated metastatic disease            I personally reviewed the CT of the abdomen and pelvis:        CONCLUSION:         1. 41 x 35 mm spiculated left lower lobe mass concerning for bronchogenic     carcinoma.  PET-CT or       soft tissue diagnosis recommended.      2. Advanced cirrhosis of the liver.  No evidence of ascites or splenomegaly.      3. Coronary artery disease.      4. Aortic valvular calcifications.      5. Extensive colonic diverticulosis without acute diverticulitis.            Pathology      Left lower lobe mass and 10 L+ for squamous cell carcinoma            Mr. Nova is a pleasant 84-year-old male who presents to the office today with    his wife and his daughter for initial evaluation of a clinical stage II with a     left lower lobe squamous cell carcinoma.  We had a merle and candid discussion     regarding the treatment of non-small cell lung cancer in regards to his clinical    stage.  We also discussed that in his age group surgical recovery can often be     complicated by a loss of independence and change in mental status as well as the    possibility of debilitation and a nursing home in the postoperative period.  We     also discussed that in the postoperative period the treatment for stage IIb lung    cancer would involve adjuvant chemotherapy.  At this point time the patient and     his daughter feel as though that this is a treatment regimen that would be     difficult to tolerate for somebody his age.  We discussed the alternative     treatment options including definitive chemotherapy and radiation versus a     palliative therapy consult if the  patient in fact wishes to not proceed with     treatment of his non-small cell lung cancer.  If surgical and resection is to be    entertained we would obtain a stress test given the patient's history of     coronary artery bypass graft.  At this point time we will not plan for follow-up    and the patient has been instructed to call us if he changes his opinion     regarding surgery.            PREVENTION      Hx Influenza Vaccination:  Yes      Date Influenza Vaccine Given:  Oct 1, 2018      Influenza Vaccine Declined:  No      2 or More Falls Past Year?:  No      Fall Past Year with Injury?:  No      Hx Pneumococcal Vaccination:  Yes      Encouraged to follow-up with:  PCP regarding preventative exams.      Chart initiated by      ASHLEY CIFUENTES MA                 Disclaimer: Converted document may not contain table formatting or lab diagrams. Please see [employer] [facility] for the authenticated document.

## 2021-05-28 NOTE — PROGRESS NOTES
Patient: CRISTI FRANKS     Acct: [id number]     Report: #[id number]  UNIT #: [id number]     : 1934    Encounter Date:2019  PRIMARY CARE: Homero Hinojosa  ***Signed***  --------------------------------------------------------------------------------------------------------------------  Chief Complaint      Encounter Date      2019            Primary Care Provider      Homero Hinojosa            Referring Provider      Terra Hickman            Patient Complaint      Patient is complaining of      New pt here for lung mass on PET scan            VITALS      Height 5 ft 9 in / 175.26 cm      Weight 182 lbs 0 oz / 82.358497 kg      BSA 1.98 m2      BMI 26.9 kg/m2      Temperature 98.1 F / 36.72 C - Temporal      Pulse 66      Respirations 16      Blood Pressure 123/79 Sitting, Left Arm      Pulse Oximetry 96%, room air            HPI      The patient is a very pleasant 84 year old male here today to be evaluated for     abnormal CT scan of the chest.             The patient underwent a PET scan on 19 after having an abnormal chest x-    ray showing a left lower lobe pulmonary mass. The PET scan showed      hypermetabolic left hilar area as well as  hypermetabolic lesion involving the     left lower lobe that was approximately 4 cm X 3 cm. The patient and his family     state that he has had a steady decline in his function over the course of the     past 3-4 months with unintentional weight loss of more than 10 pounds. He does     have shortness of breath, easily fatigued as well as weakness. His symptoms are     worse when he is doing exertional activities, worse when doing his activities of    daily living such as changing his clothes and walking to the bathroom. He denies    having any chest pain or significant cough, no hemoptysis, no fever or chills,     no night sweats. His weight loss is unintentional and has been approximately 20     pounds. He describes his symptoms as  progressing and getting worse and moderate     to severe in nature.            ROS      Constitutional:  Denies: Fatigue, Fever, Weight gain, Weight loss, Chills,     Insomnia, Other      Respiratory/Breathing:  Complains of: Cough; Denies: Shortness of air, Wheezing,    Hemoptysis, Pleuritic pain, Other      Endocrine:  Denies: Polydipsia, Polyuria, Heat/cold intolerance, Diabetes, Other      Eyes:  Denies: Blurred vision, Vision Changes, Other      Ears, nose, mouth, throat:  Denies: Mouth lesions, Thrush, Throat pain,     Hoarseness, Allergies/Hay Fever, Post Nasal Drip, Headaches, Recent Head Injury,    Nose Bleeding, Neck Stiffness, Thyroid Mass, Hearing Loss, Ear Fullness, Dry     Mouth, Nasal or Sinus Pain, Dry Lips, Nasal discharge, Nasal congestion, Other      Cardiovascular:  Denies: Palpitations, Syncope, Claudication, Chest Pain, Wake     up Gasping for air, Leg Swelling, Irregular Heart Rate, Cyanosis, Dyspnea on     Exertion, Other      Gastrointestinal:  Denies: Nausea, Constipation, Diarrhea, Abdominal pain,     Vomiting, Difficulty Swallowing, Reflux/Heartburn, Dysphagia, Jaundice,     Bloating, Melena, Bloody stools, Other      Genitourinary:  Denies: Urinary frequency, Incontinence, Hematuria, Urgency,     Nocturia, Dysuria, Testicular problems, Other      Musculoskeletal:  Denies: Joint Pain, Joint Stiffness, Joint Swelling, Myalgias,    Other      Hematologic/lymphatic:  DENIES: Lymphadenopathy, Bruising, Bleeding tendencies,     Other      Neurological:  Denies: Headache, Numbness, Weakness, Seizures, Other      Psychiatric:  Denies: Anxiety, Appropriate Effect, Depression, Other      Sleep:  No: Excessive daytime sleep, Morning Headache?, Snoring, Insomnia?, Stop    breathing at sleep?, Other      Integumentary:  Denies: Rash, Dry skin, Skin Warm to Touch, Other      Immunologic/Allergic:  Denies: Latex allergy, Seasonal allergies, Asthma,     Urticaria, Eczema, Other      Immunization status:  " No: Up to date            FAMILY/SOCIAL/MEDICAL HX      Surgical History:  Yes: Angioplasty, Appendectomy (1983), Bowel Surgery     (COLONOSCOPY), CABG (5 VESSEL 1998), Cholecystectomy (1984), Head Surgery (HANNA     CATARACT REMOVAL), Other Surgeries      Heart - Family Hx:  Brother      Diabetes - Family Hx:  Brother      Cancer/Type - Family Hx:  Mother (lung), Sister (Lung)      Is Father Still Living?:  No      Is Mother Still Living?:  No      Smoking status:  Former smoker (1.5-2 ppd x 35y )      Anticoagulation Therapy:  Yes      Antibiotic Prophylaxis:  No      Medical History:  Yes: Anemia (in march, 2005; WAS TREATED FOR ANEMIA), Art    hritis (\"MOVES AROUND\" SHOULDERS, BACK AND NECK), Cataracts (REMOVED BOTH EYES),    Chemotherapy/Cancer (MULTIPLE SKIN CANCERS REMOVED), Diverticulitis, Depression,    Anxiety, Diabetes (TYPE II), Seizures, Heart Attack (OPEN HEART SURGERY-2     STENTS-1998), Hiatal Hernia, High Blood Pressure (ON MEDS), Shortness Of Breath     (PNEUMONIA); No: Blood Disease, Chronic Bronchitis/COPD, Congestive Heart Failu,    Deafness or Ringing Ears, Hemorrhoids/Rectal Prob, Miscellaneous Medical/oth      Psychiatric History      Depression and anxiety            PREVENTION      Hx Influenza Vaccination:  Yes      Date Influenza Vaccine Given:  Oct 1, 2018      Influenza Vaccine Declined:  No      2 or More Falls Past Year?:  No      Fall Past Year with Injury?:  No      Hx Pneumococcal Vaccination:  Yes      Encouraged to follow-up with:  PCP regarding preventative exams.      Chart initiated by      Jaz Hidalgo MA            ALLERGIES/MEDICATIONS      Allergies:        Coded Allergies:             NO KNOWN DRUG ALLERGIES (Verified  Allergy, Unknown, 7/22/19)      Medications      Clotrimazole (Lotrimin 1% Cream) 15 Gm Cream..g.      1 APL TOPICAL BID, #30 GM         Prov: DIANA BARAJAS CFR         4/2/19       MDI-Albuterol (Ventolin HFA) 8 Gm Hfa.aer.ad      2 PUFFS INH Q4H PRN for " SHORTNESS OF BREATH, #1 MDI 0 Refills         Prov: DEMI OTERO cfaltaf         1/4/19       Metoprolol Succinate (Metoprolol Succinate) 25 Mg Tab.er.24h      25 MG PO HS         Reported         10/30/18       Losartan Potassium (Losartan*) 25 Mg Tablet      12.5 MG PO QDAY, #30 TAB 0 Refills         Reported         10/30/18       Insulin Degludec (Tresiba Flextouch U-200) 200 Unit/1 Ml Insuln.pen      UNITS INJ QDAY         Reported         10/30/18       Polyethylene Glycol 3350 (Smoothlax) 238 Gm Powder      17 GRAM PO BID         Reported         10/30/18       Rosuvastatin Calcium (Crestor*) 20 Mg Tablet      20 MG PO HS         Reported         10/30/18       Citalopram HBr (Citalopram HBr) 20 Mg Tablet      20 MG PO QDAY         Reported         10/30/18       QUEtiapine (QUEtiapine) 25 Mg Tablet      25 MG PO BID         Reported         10/30/18       Loratadine (Loratadine) 10 Mg Tablet      10 MG PO QDAY         Reported         10/30/18       Ranitidine Hcl (Ranitidine*) 150 Mg Tablet      150 MG PO QDAY, #30 TAB 0 Refills         Reported         10/30/18       Metformin Hcl* (Metformin Hcl*) 850 Mg Tablet      850 MG PO BID         Reported         10/30/18       Clopidogrel Bisulfate (Plavix) 75 Mg Tablet      75 MG PO QDAY         Reported         7/9/07      Current Medications      Current Medications Reviewed 7/22/19            EXAM      GEN-patient appears stated age resting comfortable in no acute distress      Eyes-PERRL,  conjunctiva are normal in appearance extraocular muscles are     intact, no scleral icterus      Lymphatic-no swollen or enlarged cervical nodes, or axillary node, or femoral     nodes, or supraclavicular nodes      Mouth normal dentition, no erythema no ulcerations oropharynx appears normal no     exudate no evidence of postnasal drip, MP I      Neck-there are no palpable supraclavicular or cervical adenopathy, thyroid is     normal in appearance no apparent nodules,  there is no inspiratory or expiratory     stridor      Respiratory-patient exhibits normal work of breathing, speaking in full     sentences without difficulty, the chest is normal in appearance, clear to     auscultation with no wheezes rales or rhonchi, chest is normal to percussion on     both the right and left sides      Cardiovascular-the heart rate is normal and regular S1 and S2 present with no     murmur or extra heart sounds, there is no JVD or pedal edema present      GI-the abdomen is normal in appearance, bowel sounds present and normal in all     quadrants no hepatosplenomegaly or masses felt      Extremities-no clubbing is present, pulses present in all extremities, capillary    refill time is normal      Musculoskeletal-Normal strength in upper and lower extremities, inspection shows    no evidence of muscle atrophy      Skin-skin is normal in appearance it is warm and dry, no rashes present, no     evidence of cyanosis, palpation reveals no masses      Neurological-the patient is alert and oriented to time place and person, moves     all 4 extremities, normal gait, normal affect and mood, CN2-12 intact      Psych-normal judgment and insight is good, normal mood and affect, alert and     oriented to person, place, and time, and date      Vtials      Vitals:             Height 5 ft 9 in / 175.26 cm           Weight 182 lbs 0 oz / 82.492423 kg           BSA 1.98 m2           BMI 26.9 kg/m2           Temperature 98.1 F / 36.72 C - Temporal           Pulse 66           Respirations 16           Blood Pressure 123/79 Sitting, Left Arm           Pulse Oximetry 96%, room air            REVIEW      Results Reviewed      [facility] Results Reviewed?:  Yes Prev Lab Results, Yes Prev Radiology Results, Yes     Previous Mecial Records            Assessment      ASSESSMENT:      1. Pulmonary mass concerning for malignancy.       2. Mediastinal lymphadenopathy.             PLAN:      1. I discussed with the patient that  we will proceed with bronchoscopy with EBUS    to biopsy the left hilar lymph nodes as well as the left lower lobe lung mass. I    have explained the risks and benefits, please see the risks and benefits section    of this note.       2. Discontinue Plavix at this time. He will need to be off Plavix 7-10 days     prior to biopsy.       3. I have personally reviewed laboratory data, imaging and previous medical     records.            Procedure Orders      Get Consent signed for:        Bronchoscopy with endobronchial ultrasound guided lymph node biopsy,      transbronchial biopsies, brushings, washings, bronchoalveolar lavage and      airway inspection.      Risks and Benefits:        I have discussed the risks of the procedure with the patient including      pneumothorax, hemothorax, bleeding, hypoxia, required mechanical      ventilation and death.  The patient recognizes these findings,      acknowledges these findings and is agreeable to the procedure.                 Disclaimer: Converted document may not contain table formatting or lab diagrams. Please see [employer] [facility] for the authenticated document.

## 2021-05-28 NOTE — PROGRESS NOTES
Patient: CRISTI FRANKS     Acct: [id number]     Report: #[id number]  UNIT #: [id number]     : 1934    Encounter Date:2019  PRIMARY CARE: Homero Hinojosa  ***Signed***  --------------------------------------------------------------------------------------------------------------------  Chief Complaint      Encounter Date      Aug 6, 2019            Primary Care Provider      Homero Hinojosa            Referring Provider      Terra Hickman            Patient Complaint      Patient is complaining of      Pt here for Ebus f/u and results, lung mass            VITALS      Height 5 ft 9 in / 175.26 cm      Weight 181 lbs 4 oz / 82.434689 kg      BSA 1.98 m2      BMI 26.8 kg/m2      Temperature 98.0 F / 36.67 C - Temporal      Pulse 71      Respirations 14      Blood Pressure 130/55 Sitting, Left Arm      Pulse Oximetry 95%, roomair            HPI      The patient is a very pleasant 84 year old  male with significant     smoking history of about 70 pack years who is a former smoker here for follow up    for endobronchial ultrasound and fine needle aspiration of lymph nodes. The     patient saw Dr. Joel with a left lower lobe lesion and left hilar lymph node     PET positive.  The patient had biopsy of the both a 10L lymph node and left lung    mass.  Both findings are consistent with nonsmall cell of squamous primary.  He     has had some scant hemoptysis since his bronchoscopy that is clearing. He does     have some shortness of breath, worse with exertion and relieved with rest. He     can only go up about 3-4 flights of steps before having to stop because of     shortness of breath.  He will likely need radiation and chemotherapy and based     off his current function, would likely be a poor candidate for surgery. The     family would still like a surgical opinion before proceeding with radiotherapy.     I informed them that he will likely need chemotherapy given that the N1 lymph      node is positive.  He denies any chest pain, nausea, vomiting, fevers, chills or    headaches.  He has an unintentional weight loss of about 20 pounds.  He is able     to perform ADLs without difficulty.  Denies any swollen glands or lymph nodes of    the head and neck.            I have personally reviewed the review of systems, past family, social, surgical     and medical histories and I agree with the findings.            ROS      Constitutional:  Denies: Fatigue, Fever, Weight gain, Weight loss, Chills, Ins    omnia, Other      Respiratory/Breathing:  Complains of: Shortness of air, Wheezing, Cough; Denies:    Hemoptysis, Pleuritic pain, Other      Endocrine:  Denies: Polydipsia, Polyuria, Heat/cold intolerance, Diabetes, Other      Eyes:  Denies: Blurred vision, Vision Changes, Other      Ears, nose, mouth, throat:  Denies: Mouth lesions, Thrush, Throat pain,     Hoarseness, Allergies/Hay Fever, Post Nasal Drip, Headaches, Recent Head Injury,    Nose Bleeding, Neck Stiffness, Thyroid Mass, Hearing Loss, Ear Fullness, Dry     Mouth, Nasal or Sinus Pain, Dry Lips, Nasal discharge, Nasal congestion, Other      Cardiovascular:  Denies: Palpitations, Syncope, Claudication, Chest Pain, Wake     up Gasping for air, Leg Swelling, Irregular Heart Rate, Cyanosis, Dyspnea on     Exertion, Other      Gastrointestinal:  Denies: Nausea, Constipation, Diarrhea, Abdominal pain,     Vomiting, Difficulty Swallowing, Reflux/Heartburn, Dysphagia, Jaundice, Bloating    , Melena, Bloody stools, Other      Genitourinary:  Denies: Urinary frequency, Incontinence, Hematuria, Urgency,     Nocturia, Dysuria, Testicular problems, Other      Musculoskeletal:  Denies: Joint Pain, Joint Stiffness, Joint Swelling, Myalgias,    Other      Hematologic/lymphatic:  DENIES: Lymphadenopathy, Bruising, Bleeding tendencies,     Other      Neurological:  Denies: Headache, Numbness, Weakness, Seizures, Other      Psychiatric:  Denies: Anxiety,  "Appropriate Effect, Depression, Other      Sleep:  No: Excessive daytime sleep, Morning Headache?, Snoring, Insomnia?, Stop    breathing at sleep?, Other      Integumentary:  Denies: Rash, Dry skin, Skin Warm to Touch, Other      Immunologic/Allergic:  Denies: Latex allergy, Seasonal allergies, Asthma,     Urticaria, Eczema, Other      Immunization status:  No: Up to date            FAMILY/SOCIAL/MEDICAL HX      Surgical History:  Yes: Angioplasty, Appendectomy (1983), Bowel Surgery     (COLONOSCOPY), CABG (5 VESSEL 1998), Cholecystectomy (1984), Head Surgery (HANNA     CATARACT REMOVAL), Other Surgeries      Heart - Family Hx:  Brother      Diabetes - Family Hx:  Brother      Cancer/Type - Family Hx:  Mother (lung), Sister (Lung)      Is Father Still Living?:  No      Is Mother Still Living?:  No       Family History:  Yes      Social History:  No Tobacco Use, No Alcohol Use, No Recreational Drug use      Smoking status:  Former smoker (1.5-2 ppd x 35y )      Anticoagulation Therapy:  Yes      Antibiotic Prophylaxis:  No      Medical History:  Yes: Anemia (in march, 2005; WAS TREATED FOR ANEMIA),     Arthritis (\"MOVES AROUND\" SHOULDERS, BACK AND NECK), Cataracts (REMOVED BOTH     EYES), Chemotherapy/Cancer (MULTIPLE SKIN CANCERS REMOVED), Diverticulitis,     Depression, Anxiety, Diabetes (TYPE II), Seizures, Heart Attack (OPEN HEART     SURGERY-2 STENTS-1998), Hemorrhoids/Rectal Prob (DIVERTICULOSIS, ESOPHAGEAL     STRICTURE W/ DILITATION), Hiatal Hernia, High Blood Pressure (ON MEDS),     Shortness Of Breath (PNEUMONIA); No: Asthma, Blood Disease, Chronic     Bronchitis/COPD, Congestive Heart Failu, Deafness or Ringing Ears, Sinus     Trouble, Miscellaneous Medical/oth      Psychiatric History      Anxiety and depression            PREVENTION      Hx Influenza Vaccination:  Yes      Date Influenza Vaccine Given:  Oct 1, 2018      Influenza Vaccine Declined:  No      2 or More Falls Past Year?:  No      Fall Past " Year with Injury?:  No      Hx Pneumococcal Vaccination:  Yes      Encouraged to follow-up with:  PCP regarding preventative exams.      Chart initiated by      Wesley Will MA            ALLERGIES/MEDICATIONS      Allergies:        Coded Allergies:             NO KNOWN DRUG ALLERGIES (Verified  Allergy, Unknown, 8/6/19)      Medications    Last Reconciled on 8/6/19 15:11 by ENRIQUE COPELAND MD      Insulin Degludec (Tresiba Flextouch U-200) 200 Unit/1 Ml Insuln.pen      80 UNITS SUBQ QDAY for 30 Days, #1 INSULN.PEN         Reported         8/2/19       (Fluticasone)   No Conflict Check      50 MG INH QDAY         Reported         8/2/19       Meclizine Hcl (Meclizine*) 12.5 Mg Tablet      12.5 MG PO TID PRN for VERTIGO, #90 TAB 0 Refills         Reported         8/2/19       Loratadine (Loratadine) 10 Mg Tablet      10 MG PO HS, #30 TAB 0 Refills         Reported         8/2/19       Clotrimazole (Lotrimin 1% Cream) 15 Gm Cream..g.      1 APL TOPICAL BID, #30 GM         Prov: DIANA BARAJAS CFR         4/2/19       Metoprolol Succinate (Metoprolol Succinate) 25 Mg Tab.er.24h      25 MG PO HS         Reported         10/30/18       Losartan Potassium (Losartan*) 25 Mg Tablet      12.5 MG PO QDAY, #30 TAB 0 Refills         Reported         10/30/18       Rosuvastatin Calcium (Crestor*) 20 Mg Tablet      20 MG PO HS         Reported         10/30/18       Citalopram HBr (Citalopram HBr) 20 Mg Tablet      20 MG PO QDAY         Reported         10/30/18       QUEtiapine (QUEtiapine) 25 Mg Tablet      25 MG PO BID         Reported         10/30/18       Ranitidine Hcl (Ranitidine*) 150 Mg Tablet      150 MG PO QDAY, #30 TAB 0 Refills         Reported         10/30/18       Metformin Hcl* (Metformin Hcl*) 850 Mg Tablet      850 MG PO BID         Reported         10/30/18       Clopidogrel Bisulfate (Plavix) 75 Mg Tablet      75 MG PO QDAY         Reported         7/9/07      Current Medications      Current Medications  Reviewed 19            EXAM      Vital Signs Reviewed.      General:  WDWN, Alert, NAD.      HEENT: PERRL, EOMI.  OP, nares clear, no sinus tenderness.      Neck: Supple, no JVD, no thyromegaly.      Lymph: No axillary, cervical, supraclavicular lymphadenopathy noted bilaterally.      Chest: Good aeration, clear to auscultation bilaterally, tympanic to percussion     bilaterally, no work of breathing noted.      CV: RRR, no MGR, pulses 2+, equal.        Abd: Soft, NT, ND, +BS, no HSM.      EXT: No clubbing, no cyanosis, no edema, no joint tenderness.        Neuro:  A  Skin: No rashes or lesions.      Vtials      Vitals:             Height 5 ft 9 in / 175.26 cm           Weight 181 lbs 4 oz / 82.735644 kg           BSA 1.98 m2           BMI 26.8 kg/m2           Temperature 98.0 F / 36.67 C - Temporal           Pulse 71           Respirations 14           Blood Pressure 130/55 Sitting, Left Arm           Pulse Oximetry 95%, roomair            REVIEW      Results Reviewed      PCCS Results Reviewed?:  Yes Prev Lab Results, Yes Prev Radiology Results, Yes     Previous [facility] Records      Lab Results      I reviewed Dr. Joel's last office note. I reviewed Dr. Joel's bronchoscopy     note. Bronchoscopy pathology of 10L lymph node and the left lung mass are     consistent with squamous cell carcinoma of the lung.  I personally reviewed the     patient's PET scan.              Patient: CRISTI FRANKS   Acct #: [id number]         Report #: [id number]   : 1934 Report #: [id number]      MR #: [id number]   Location: ENDO                                ***Signed***      Procedure Note      Procedure name: Bronchoscopy with endobronchial ultrasound guided transbronchial     fine needle aspiration of the stations 10 L, and biopsy of the left lower lung     mass            Indication left lower lung mass with PET positive left hilar node            Risk versus benefits of bronchoscopy explained to the  patient including death,     respiratory failure requiring intubation mechanical ventilation, pneumothorax     requiring chest tube placement, bleeding, patient voices understanding of the     risk of the procedure and wishes to proceed.            Sedation-IV MAC anesthesia per the anesthesia service            Procedure details      Patient was brought back to the endoscopy suite she was sedated with IV MAC     anesthesia, a bite block was placed on oral cavity and endobronchial ultrasound     scope was then used to intubate the trachea, a survey was done of the mediasti    nal hilar lymph node stations, and then the endobronchial ultrasound scope was     used to take in a bronchial ultrasound guided transbronchial needle aspirations     of the following lymph node sites,10L, and biopsy of the left lower lobe lung     mass next the endobronchial ultrasound scope was removed the therapeutic scope     was then inserted into the oral cavity and the biopsy sites were examined.      There is no evidence of any active bleeding from any biopsy sites, there is no     endobronchial lesion seen to the segmental level of the right tracheal bronchial     tree and on the left tracheobronchial tree.            Procedures performed      endobronchial ultrasound guided transbronchial fine needle aspiration of the     stations 10 L      endobronchial ultrasound guided transbronchial fine needle aspiration of the     left lower lobe lung mass            Postoperative diagnosis      Left lower lobe lung mass            Patient tolerated procedure well            No immediate complications            Plan      Patient is a followup to see me next week in clinic over the results of cytology            Procedure(s) Performed      Bronchoscopy:  [address] BRONCH W/EBUS 1-2 Mateus Palomares                 Aug 2, 2019 13:22               <Electronically signed by Mateus Joel DO>  08/02/19 1322      Radiographic Results                [facility]                PACS RADIOLOGY REPORT            Patient: CRISTI FRANKS   Acct: #[id number]   Report: #[id number]            UNIT #: [id number]    DOS: 19 0953      INSURANCE:[employer]   ORDER #:[id number]      LOCATION:PET     : 1934            PROVIDERS      ADMITTING:     ATTENDING: Terra Hickman      FAMILY:  Terra Hickman   ORDERING:  Terra Hickman         OTHER: Homero Hinojosa   DICTATING:  Dilip Sanchez MD            REQ #:[id number]   EXAM:[id number] - SKULL BASE-MIDTHIGH INIT fdg      REASON FOR EXAM:  R91.8      REASON FOR VISIT:  R91.8            *******Signed******         PROCEDURE:   PET CT SKULL BASE TO MID THIGH INITIAL             COMPARISON:   [facility], CT, ABD PEL W/O CONTRAST, 2019,     15:00.             INDICATIONS:   R91.8             TECHNIQUE:   After obtaining the patient's consent, F-18 FDG was administered     intravenously.  A PET       scan with concurrent CT imaging was performed from the skull to the thigh with     multiplanar imaging.             RADIONUCLIDE:     11.45 MCI   F18 FDG- I.V.      LABS:                          Blood Glucose 188 mg/dl             FINDINGS:         Today's study is compared 2 CT of the abdomen pelvis performed on 2019.      Today's study reveals       that there is a 4.1 cm x 3.5 cm in size mass in the anterior superior aspect of     the left lower lobe       of the lung that touches the inferior lateral aspect of the left pulmonary hilum     has maximum       standard uptake value of 9.01 consistent with cancer.  There is a small focal     tiny area of       increased nuclear PET activity in the left pulmonary hilum with maximum standard     uptake value of       5.66 suspicious for very early metastatic adenopathy.  No evidence of skeletal     metastatic disease.        No evidence of metastatic disease  in the liver or adrenal glands.  No evidence     mass or adenopathy       in the mediastinum.  No evidence of mass or metastatic disease in the right     lung.             CONCLUSION:         1. 4.1 cm x 3.5 cm in size mass in the anterior superior aspect of the left     lower lobe of the lung       that touches the inferior lateral aspect of the left pulmonary hilum with     maximum standard uptake       value of 9.01 consistent with cancer.      2. Small tiny dot of increased nuclear PET activity in the left pulmonary hilum     with maximum       standard uptake value of 5.66 is suspicious for very early metastatic juan jose    nopathy.              RAMÍREZ RAGSDALE MD             Electronically Signed and Approved By: RAMÍREZ RAGSDALE MD on 7/16/2019 at     12:26                        Until signed, this is an unconfirmed preliminary report that may contain      errors and is subject to change.                                              ABY:      D:07/16/19 1226            Assessment      Non-small cell cancer of left lung - C34.92            Mediastinal adenopathy - R59.0            Notes      New Diagnostics      * PFT Comp PrePost DLCO BodBx sx, As Soon As Possible         Dx: Non-small cell cancer of left lung - C34.92      IMPRESSION:      1.  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  It is     T2N1M0.        2. Metastatic left hilar adenopathy.      3. Tobacco abuse with cigarettes in remission.      4. Iatrogenic hemoptysis secondary to bronchoscopy.               PLAN:      1.  Hold Plavix for another week.  Can restart once a month when this resolves.      2.  This patient has T2N1M0 lung cancer.  Case was discussed in     multidisciplinary fashion with myself, radiation oncology, hematology oncology,     and radiology.        3.  We will have Dr. Gracia evaluate the patient today for chemotherapy.  The     patient based off current functional status is likely a poor  surgical candidate     and will need concurrent chemoradiation.        4. Discussed the case with oncology who will order a radiation oncology referral     as well as MRI of brain to complete staging.      5. We will check full PFTs per patient and family and preference.  They would     like to see Dr. Fleming next week and get a surgical opinion for candidacy before     proceeding with radiation therapy.  They are aware that he is likely a poor     surgical candidate, but would like a surgical opinion.  We will arrange his     follow up.      6. Up-to-date with flu, Prevnar and Pneumovax.      7. Follow up with us next week.            Patient Education      Patient Education Provided:  Lung Cancer                 Disclaimer: Converted document may not contain table formatting or lab diagrams. Please see [employer] for the authenticated document.

## 2021-05-28 NOTE — PROGRESS NOTES
"Patient: CRISTI NOVA     Acct: [id number]     Report: #[id number]  UNIT #: [id number]     : 1934    Encounter Date:2019  PRIMARY CARE: Homero Hinojosa  ***Signed***  --------------------------------------------------------------------------------------------------------------------  NURSE INTAKE      Visit Type      Established Patient Visit            Chief Complaint      TX      Intent of Therapy:  Curative            Referring Provider/Copies To      Referring Provider:  Terra Hickman      Primary Care Provider:  Homero Hinojosa            History and Present Illness      Past Oncology Illness History      Mr. Nova is an 85yo WM recently dx with LLL cancer.  He reports bouts of     abdominal pain/spells and  to additional diagnostic work up that     included CT scan which demostrated a lesion in the LLL.  PET scan completed on     19 showed LLL mass with small amt uptake in left hilum.  Biopsy taken on     19 returned Squamous lung cancer.  Pt reports no significant wt loss.  He     does recollect a nonproductive cough for last several months; however, no     hemoptysis.        PMH: CABG x5 with 2 stents placed , DM, htn, hyperlipidemia.  Pt quit     smoking at age 51, does not drink ETOH or use illicit drugs.      Family hx:  mother-had lung ca-never smoked, maternal aunt lung ca. Father      from blood clot            HPI - Oncology Interim      F/u left lung ca--due for wk4 Carbo/Taxol--he has lost several kg and reports     painful swallowing.  Appetite is poor-has been drinking Ensure; however, he     feels it caused diarrhea.  He has a chronic \"rash\" on his legs.  He is using     walker for assistance d/t fatigue and weakness.  Denies overt n/v.  He denies     diarrhea or hematochezia at this time.  No distress noted.            Cancer Details            [id number]            Clinical Staging      Stage IIA ([id number])            Treatments "      Chemotherapy      Wkly Carbo/Taxol initiated 8/27/19      Radiation Therapy      Concurrent LLL XRT            Clinical Trial Participant      No            ECOG Performance Status      0            Most Recent Lab Findings      Laboratory Tests      9/17/19 08:50            PAST, FAMILY   Past Medical History      Past Medical History:  Arthritis, Diabetes Type 2, High Cholesterol, Liver     Disease, Stroke      Other PMH:        CIRRHOSIS      Hematology/Oncology (M):  Lung Cancer, Skin Cancer            Past Surgical History      Cataract Surgery, Cholecystectomy, Coronary Stent, Lung Biopsy, Skin Cancer     Removal            Family History      Family History:  Lung Cancer (SISTER AND MOTHER), Skin Cancer (BROTHER)            Social History      Marital Status:        Lives independently:  Yes      Number of Children:  3      Occupation:  RETIRED            Tobacco Use      Tobacco status:  Former smoker (1.5-2 ppd x 35y )            Alcohol Use      Alcohol intake:  None            Substance Use      Substance use:  Denies use            REVIEW OF SYSTEMS      General:  Admits: Fatigue      Eye:  Admits Corrective Lenses      ENT:  Admits Hearing Loss, Admits Sore Throat      Cardiovascular:  Denies Chest Pain      Respiratory:  Admits: Productive Cough, Shortness of Air      Gastrointestinal:  Denies Constipation, Denies Diarrhea      Musculoskeletal:  Denies Muscle Pain      Integumentary:  Admits Itching, Admits Rash (LEGS)      Neurologic:  Admits Numbness\Tingling (LEGS)            VITAL SIGNS AND SCORES      Vitals      Weight 178 lbs 5.634 oz / 80.9 kg      Temperature 98.6 F / 37 C - Temporal      Pulse 69      Respirations 18      Blood Pressure 119/61 Sitting      Pulse Oximetry 98%, RM AIR            Pain Score      Pain Scale Used:  Numerical      Pain Intensity:  0            Fatigue Score      Fatigue (0-10 scale):  7            EXAM      General Appearance:  Positive for: Alert,  Oriented x3, Cooperative;          Negative for: Acute Distress      Eye:  Positive for: Anicteric Sclerae, Moist Conjunctiva      Neck:  Positive for: Supple;          Negative for: JVD, Masses      Respiratory:  Positive for: CTAB, Normal Respiratory Effort      Abdomen/Gastro:  Positive for: Normal Active Bowel Sounds, Soft;          Negative for: Distention, Hepatosplenomegaly, Tenderness      Cardiovascular:  Positive for: Peripheral Edema (Chronic venous stasis changes     bilateral, no edema), RRR;          Negative for: Gallop, Murmur, Rub      Psychiatric:  Positive for: Appropriate Affect, Intact Judgement      Lymphatic:  Negative for: Cervical, Infraclavicular, Supraclavicular            PREVENTION      Hx Influenza Vaccination:  Yes      Date Influenza Vaccine Given:  Oct 1, 2018      Influenza Vaccine Declined:  No      2 or More Falls Past Year?:  No      Fall Past Year with Injury?:  No      Hx Pneumococcal Vaccination:  Yes      Encouraged to follow-up with:  PCP regarding preventative exams.      Chart initiated by      ASHLEY CIFUENTES MA            ALLERGY/MEDS      Allergies      Coded Allergies:             NO KNOWN DRUG ALLERGIES (Verified  Allergy, Unknown, 9/24/19)            Medications      Last Reconciled on 9/24/19 09:32 by JAY SWIFT      Mouthwash (Magic Mouthwash) 1 Each Bottle      15 ML PO Q4H PRN for esophagitis for 30 Days, #360 ML 5 Refills         Prov: JUAN PABLO WEINSTEIN         9/24/19       Benzonatate (Tessalon Perles) 100 Mg Cap      100 MG PO TID PRN for COUGH for 30 Days, #90 CAP 3 Refills         Prov: JUAN PABLO WEINSTEIN         9/3/19       Hydrocodone/Acetaminophen 5/325 MG (Hydrocodone/Acetaminophen 5/325 MG) 1 Each     Tablet      1-2 TAB PO Q4H for Post Surgical PAin, #8 TAB 0 Refills         Prov: Dalton Randall         8/26/19       Prochlorperazine Maleate (Prochlorperazine Maleate) 10 Mg Tab      10 MG PO Q6H PRN for NAUSEA AND/OR VOMITING for 30 Days, #90 TAB 4  Refills         Prov: JUAN PABLO WEINSTEIN         8/6/19       Ondansetron Hcl (ONDANSETRON HCL) 8 Mg Tablet      8 MG PO Q8H PRN for NAUSEA, #60 TAB 3 Refills         Prov: JUAN PABLO WEINSTEIN         8/6/19       Insulin Degludec (Tresiba Flextouch U-200) 200 Unit/1 Ml Insuln.pen      80 UNITS SUBQ QDAY for 30 Days, #1 INSULN.PEN         Reported         8/2/19       (Fluticasone)   No Conflict Check      50 MG INH QDAY         Reported         8/2/19       Meclizine Hcl (Meclizine*) 12.5 Mg Tablet      12.5 MG PO TID PRN for VERTIGO, #90 TAB 0 Refills         Reported         8/2/19       Loratadine (Loratadine) 10 Mg Tablet      10 MG PO HS, #30 TAB 0 Refills         Reported         8/2/19       Clotrimazole (Lotrimin 1% Cream) 15 Gm Cream..g.      1 APL TOPICAL BID, #30 GM         Prov: DIANA BARAJAS [facility]         4/2/19       Metoprolol Succinate (Metoprolol Succinate) 25 Mg Tab.er.24h      25 MG PO HS         Reported         10/30/18       Losartan Potassium (Losartan*) 25 Mg Tablet      12.5 MG PO QDAY, #30 TAB 0 Refills         Reported         10/30/18       Rosuvastatin Calcium (Crestor*) 20 Mg Tablet      20 MG PO HS         Reported         10/30/18       Citalopram HBr (Citalopram HBr) 20 Mg Tablet      20 MG PO QDAY         Reported         10/30/18       QUEtiapine (QUEtiapine) 25 Mg Tablet      25 MG PO BID         Reported         10/30/18       Ranitidine HCl (Ranitidine HCl) 150 Mg Tablet      150 MG PO QDAY, #30 TAB 0 Refills         Reported         10/30/18       Metformin Hcl* (Metformin Hcl*) 850 Mg Tablet      850 MG PO BID         Reported         10/30/18       Clopidogrel Bisulfate (Plavix) 75 Mg Tablet      75 MG PO QDAY         Reported         7/9/07      Medications Reviewed:  Changes made to meds            IMPRESSION/PLAN      Diagnosis      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  He is due for cycle 4 of weekly carboplatin     and Taxol.   Tolerating well.  Lab work is adequate for treatment.  Proceed with     cycle as planned.  RTC 1 week for OV, cycle 5 with labs prior.  Continue     radiation as per radiation oncology            Esophagitis - K20.9      Likely related to radiation.  Magic mouthwash will be prescribed.  Push     nutrition as much as possible.            Notes      New Medications      * MOUTHWASH (Magic Mouthwash) 1 EACH BOTTLE: 15 ML PO Q4H PRN esophagitis 30       Days #360         Instructions: This compound contains: 50% Viscous Lidocaine 2% 30% Pepto        Bismol 20% Diphenhydramine 25 mg/10 ml            Patient Education            DI for Esophagitis      Nutritional Deficiency (Alternative Therapy)      Nutritional Support (General) (Alternative Therapy)      Patient Education Provided:  Yes                 Disclaimer: Converted document may not contain table formatting or lab diagrams. Please see [employer] for the authenticated document.

## 2021-05-28 NOTE — PROGRESS NOTES
Patient: CRISTI NOVA     Acct: [id number]     Report: #[id number]  UNIT #: [id number]     : 1934    Encounter Date:2020  PRIMARY CARE: Homero Hinojosa  ***Signed***  --------------------------------------------------------------------------------------------------------------------  NURSE INTAKE      Visit Type      Established Patient Visit            Chief Complaint      L LUNG CA F/U            Referring Provider/Copies To      Referring Provider:  Terra Hickman      Primary Care Provider:  Homero Hinojosa      Copies To:   Homero Hinojosa            History and Present Illness      Past Oncology Illness History      Mr. Nova is an 85yo WM recently dx with LLL cancer.  He reports bouts of     abdominal pain/spells and  to additional diagnostic work up that     included CT scan which demostrated a lesion in the LLL.  PET scan completed on     19 showed LLL mass with small amt uptake in left hilum.  Biopsy taken on     19 returned Squamous lung cancer.  Pt reports no significant wt loss.  He     does recollect a nonproductive cough for last several months; however, no he    moptysis.        PMH: CABG x5 with 2 stents placed , DM, htn, hyperlipidemia.  Pt quit     smoking at age 51, does not drink ETOH or use illicit drugs.      Family hx:  mother-had lung ca-never smoked, maternal aunt lung ca. Father      from blood clot            HPI - Oncology Interim      Patient returns for follow-up of his lung cancer.  He continues to have cough     which is generally nonproductive.  He denies fever chills.  He denies any     unusual aches or pains unless he has a coughing spell in which case his chest     muscles hurt.  He has been using Robitussin with some improvement.  He is eating    and drinking adequately, his weight is maintained.  He denies new masses or     lymphadenopathy.            Cancer Details            [id number]            Clinical  Staging      Stage IIA ([id number])            Treatments      Chemotherapy      Wkly Carbo/Taxol initiated 19      Radiation Therapy      Concurrent LLL XRT            Clinical Trial Participant      No            ECOG Performance Status      0            Most Recent Lab Findings      Laboratory Tests      20 10:42            Most Recent Imaging Findings      Patient: CRISTI FRANKS   Acct: #[id number]   Report: #[id number]            UNIT #: [id number]    DOS: 20 1107      INSURANCE:[employer]   ORDER #:[id number]      LOCATION:CT     : 1934            PROVIDERS      ADMITTING:     ATTENDING: NOLVIA CANCHOLA      FAMILY:  Homero Hinojosa   ORDERING:  NOLVIA CANCHOLA         OTHER:    DICTATING:  Anatoliy Urias MD            REQ #:[id number]   EXAM:CHW - CT CHEST with CONTRAST      REASON FOR EXAM:  LUNG CA RESTAGING      REASON FOR VISIT:  LUNG CA RESTAGING            *******Signed******         PROCEDURE:   CT CHEST W/ CONTRAST             COMPARISON:   [facility], PET, SKULL BASE TO MID THIGH INITIAL,     2019, 10:42.             INDICATIONS:   LEFT LUNG CANCER RESTAGING             TECHNIQUE:   After obtaining the patient's consent, CT images were obtained with    non-ionic       intravenous contrast material.               PROTOCOL:     Standard imaging protocol performed                RADIATION:     DLP: 475mGy*cm          Automated exposure control was utilized to minimize radiation dose.       CONTRAST:   100cc Isovue 370 I.V.             FINDINGS:         There is a small left pleural effusion measuring 1.3 cm in thickness.  A 1.2 cm     subcarinal lymph       node is noted.  Several other sub cm mediastinal lymph nodes are present     nonpathologic by size       criteria             Moderate emphysematous changes are present.  Sub cm pulmonary nodules in the r    ight upper lobe       appear stable since 2019.  There is moderate  airspace opacity in the mid     and lower left lung       field.  This is suspected to largely represent post XRT changes.  Superimposed     pneumonia would be       difficult to exclude.  In the left lower lobe on image 47 is a spiculated nodule    measuring 2.2 cm       consistent with the patient's primary malignancy.  It is smaller compared to the    PET-CT dated       7/16/2019 at which time it measured 4.6 cm x 3.5 cm.             A 2.1 cm simple left renal cyst is noted.               CONCLUSION:         1. 2.2 cm primary bronchogenic neoplasm in the left lower lobe, smaller in the     interval      2. Moderate to severe airspace opacity in the mid and lower left lung field     favored to largely       represent post XRT change.  Superimposed pneumonia would be difficult to     exclude.  Other neoplasm       could potentially be obscured.      3. Small left pleural effusion      4. Mildly prominent subcarinal lymph node, stable in the interval              Anatoliy Urias M.D.             Electronically Signed and Approved By: Anatoliy Urias M.D. on 1/24/2020 at 15:51                           Until signed, this is an unconfirmed preliminary report that may contain      errors and is subject to change.                                              WURRO:      D:01/24/20 1551            PAST, FAMILY   Past Medical History      Past Medical History:  Arthritis, Diabetes Type 2, High Cholesterol, Liver     Disease, Stroke      Other PMH:        CIRRHOSIS      Hematology/Oncology (M):  Lung Cancer, Skin Cancer            Past Surgical History      Cataract Surgery, Cholecystectomy, Coronary Stent, Lung Biopsy, Skin Cancer     Removal            Family History      Family History:  Lung Cancer, Skin Cancer            Social History      Marital Status:        Lives independently:  Yes      Number of Children:  3      Occupation:  RETIRED            Tobacco Use      Tobacco status:  Former smoker             Alcohol Use      Alcohol intake:  None            Substance Use      Substance use:  Denies use            REVIEW OF SYSTEMS      General:  Admits: Fatigue      Eye:  Admits Corrective Lenses      ENT:  Denies Headache; Admits Hearing Loss      Cardiovascular:  Denies Chest Pain      Respiratory:  Denies: Productive Cough, Shortness of Air      Gastrointestinal:  Admits Nausea/Vomiting; Denies Diarrhea      Musculoskeletal:  Denies Back Pain, Denies Muscle Pain      Neurologic:  Admits Dizziness            VITAL SIGNS AND SCORES      Vitals      Weight 174 lbs 13.196 oz / 79.3 kg      Temperature 97.2 F / 36.22 C - Temporal      Pulse 95      Respirations 18      Blood Pressure 117/56 Sitting, Left Arm      Pulse Oximetry 95%, RM AIR            Pain Score      Pain Scale Used:  Numerical            Fatigue Score      Fatigue (0-10 scale):  7            EXAM      General Appearance:  Positive for: Alert, Oriented x3, Cooperative;          Negative for: Acute Distress      Eye:  Positive for: Anicteric Sclerae, Moist Conjunctiva      Neck:  Positive for: Supple;          Negative for: JVD, Masses      Respiratory:  Positive for: CTAB, Normal Respiratory Effort      Abdomen/Gastro:  Positive for: Normal Active Bowel Sounds, Soft;          Negative for: Distention, Hepatosplenomegaly, Tenderness      Cardiovascular:  Positive for: RRR;          Negative for: Gallop, Murmur, Peripheral Edema, Rub      Psychiatric:  Positive for: Appropriate Affect, Intact Judgement      Lymphatic:  Negative for: Cervical, Infraclavicular, Supraclavicular            PREVENTION      Hx Influenza Vaccination:  Yes      Date Influenza Vaccine Given:  Nov 1, 2019      Influenza Vaccine Declined:  No      2 or More Falls Past Year?:  No      Fall Past Year with Injury?:  No      Hx Pneumococcal Vaccination:  Yes      Encouraged to follow-up with:  PCP regarding preventative exams.      Chart initiated by      ASHLEY CIFUENTES MA             ALLERGY/MEDS      Allergies      Coded Allergies:             NO KNOWN DRUG ALLERGIES (Verified  Allergy, Unknown, 2/3/20)            Medications      Last Reconciled on 2/3/20 16:02 by WOJCIECH ANTHONY      Benzonatate (Tessalon Perles) 100 Mg Cap      100 MG PO TID PRN for COUGH for 30 Days, #90 CAP 3 Refills         Prov: WOJCIECH ANTHONY         2/3/20       guaiFENesin -30 Mg (Mucinex DM -30 Mg Tablet) 1 Each Tab.er.12h      1 TAB PO BID, #24 TAB.ER         Prov: Sattaur,Zamil cfr         1/23/20       Acetaminophen Es (Tylenol Extra Strength) 500 Mg Tablet      500 MG PO Q6H PRN for PAIN OR FEVER, #30 TAB 0 Refills         Prov: Sattaur,Zamil cfr         1/23/20       Benzonatate (Tessalon Perles) 100 Mg Cap      100 MG PO TID, #30 CAP         Prov: Sattaur,Zamil cfr         1/23/20       Cefdinir (CEFDINIR) 300 Mg Cap      300 MG PO BID, #20 CAP         Prov: Sattaur,Zamil cfr         1/23/20       Oseltamivir Phosphate (Tamiflu*) 75 Mg Capsule      75 MG PO BID, #10 CAP         Prov: Sattaur,Zamil cfr         1/23/20       Insulin Degludec (Tresiba Flextouch U-200) 200 Unit/1 Ml Insuln.pen      80 UNITS SUBQ QDAY for 30 Days, #1 INSULN.PEN         Reported         8/2/19       (Fluticasone)   No Conflict Check      50 MG INH QDAY PRN for ALLERGIES         Reported         8/2/19       Meclizine Hcl (Meclizine*) 12.5 Mg Tablet      12.5 MG PO TID PRN for VERTIGO, #90 TAB 0 Refills         Reported         8/2/19       Metoprolol Succinate (Metoprolol Succinate) 25 Mg Tab.er.24h      25 MG PO HS         Reported         10/30/18       Losartan Potassium (Losartan*) 25 Mg Tablet      12.5 MG PO QDAY, #30 TAB 0 Refills         Reported         10/30/18       Rosuvastatin Calcium (Crestor*) 20 Mg Tablet      20 MG PO HS         Reported         10/30/18       Citalopram HBr (Citalopram HBr) 20 Mg Tablet      20 MG PO QDAY         Reported         10/30/18       QUEtiapine (QUEtiapine) 25 Mg Tablet       25 MG PO BID         Reported         10/30/18       raNITIdine HCL (raNITIdine HCL) 150 Mg Tablet      150 MG PO QDAY PRN for HEARTBURN, #30 TAB 0 Refills         Reported         10/30/18       metFORMIN HCl (metFORMIN HCl) 850 Mg Tablet      850 MG PO BID         Reported         10/30/18       Clopidogrel Bisulfate (Plavix) 75 Mg Tablet      75 MG PO QDAY         Reported         7/9/07      Medications Reviewed:  No Changes made to meds            IMPRESSION/PLAN      Diagnosis      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  I see no evidence of disease recurrence by his history,     physical examination, labs or recent scan.  He does have ongoing cough which is     likely related to his underlying emphysema.  Prescription for Tessalon Perles     provided.  He should follow-up with pulmonology.  I will see him back in 3     months time for ongoing surveillance labs and scans prior      New Medications      * Benzonatate (Tessalon Perles) 100 MG CAP: 100 MG PO TID PRN COUGH 30 Days #90      New Diagnostics      * Chest W/ Cont CT, 3 Months         Dx: Squamous cell carcinoma of left lung - C34.92      * CBC With Auto Diff, 3 Months         Dx: Squamous cell carcinoma of left lung - C34.92      * CMP Comp Metabolic Panel, 3 Months         Dx: Squamous cell carcinoma of left lung - C34.92      * LDH, 3 Months         Dx: Squamous cell carcinoma of left lung - C34.92            Patient Education      Patient Education Provided:  Yes            Electronically signed by WOJCIECH ANTHONY  02/03/2020 16:02       Disclaimer: Converted document may not contain table formatting or lab diagrams. Please see [employer] for the authenticated document.

## 2021-05-28 NOTE — PROGRESS NOTES
Patient: CRISTI NOVA     Acct: [id number]     Report: #[id number]  UNIT #: [id number]     : 1934    Encounter Date:2019  PRIMARY CARE: Homero Hinojosa  ***Signed***  --------------------------------------------------------------------------------------------------------------------  NURSE INTAKE      Visit Type      Established Patient Visit            Chief Complaint      BUZZ CA      Intent of Therapy:  Palliative            Referring Provider/Copies To      Referring Provider:  Terra Hickman      Primary Care Provider:  Homero Hinojosa            History and Present Illness      Past Oncology Illness History      Mr. Nova is an 85yo WM recently dx with LLL cancer.  He reports bouts of     abdominal pain/spells and  to additional diagnostic work up that     included CT scan which demostrated a lesion in the LLL.  PET scan completed on     19 showed LLL mass with small amt uptake in left hilum.  Biopsy taken on     19 returned Squamous lung cancer.  Pt reports no significant wt loss.  He     does recollect a nonproductive cough for last several months; however, no hemop    tysis.        PMH: CABG x5 with 2 stents placed , DM, htn, hyperlipidemia.  Pt quit     smoking at age 51, does not drink ETOH or use illicit drugs.      Family hx:  mother-had lung ca-never smoked, maternal aunt lung ca. Father      from blood clot            HPI - Oncology Interim      F/u new dx lt lung cancer--he continues to have scant hemoptysis since bronch.      He denies chest discomfort or inc SOA at this time.  Appetite is good; wt is     stable. He s/w Dr. Fleming regarding surgical intervention.  He also recently saw    rad onc to discuss XRT.  He is considering his options.  No distress noted.            Cancer Details            [id number]            Clinical Staging      Stage IIA (S8kF2T0)            Treatments      Chemotherapy      Wkly Carbo/Taxol       Radiation Therapy      Concurrent LLL XRT            Clinical Trial Participant      No            ECOG Performance Status      0            Most Recent Lab Findings      Laboratory Tests      8/6/19 15:59            Most Recent Imaging Findings      8/12/19            PROCEDURE:   MRI BRAIN WITHOUT AND WITH CONTRAST             COMPARISON:   [facility], MR, BRAIN/IAC'S W/WO, 12/20/2007, 13:35.     [facility], PET, SKULL BASE TO MID THIGH INITIAL, 7/16/2019, 10:42.               INDICATIONS:   NEW DIAGNOSIS OF LUNG CANCER             CONTRAST:   18ML  Multihance I.V.             TECHNIQUE:   Multiplanar multisequence images of the brain with and without     intravenous gadolinium.             FINDINGS:      The ventricles have a normal size and configuration.  There is no evidence of     acute intracranial       hemorrhage, mass or midline shift.  No extra-axial fluid collections are     identified.  There are       moderate chronic appearing changes in the periventricular white matter.  There     are no areas of       abnormal contrast enhancement.  The paranasal sinuses and mastoid air cells are     grossly clear.             IMPRESSION:  No acute intracranial abnormalities are identified.  No evidence of    intracranial       metastatic disease.            PAST, FAMILY   Past Medical History      Past Medical History:  Arthritis, Diabetes Type 2, High Cholesterol, Liver     Disease, Stroke      Other PMH:        CIRRHOSIS      Hematology/Oncology (M):  Lung Cancer, Skin Cancer            Past Surgical History      Cataract Surgery, Cholecystectomy, Coronary Stent, Lung Biopsy, Skin Cancer     Removal            Family History      Family History:  Lung Cancer (SISTER AND MOTHER), Skin Cancer (BROTHER)            Social History      Marital Status:        Lives independently:  Yes      Number of Children:  3      Occupation:  RETIRED            Tobacco Use      Tobacco  status:  Former smoker (1.5-2 ppd x 35y )            Alcohol Use      Alcohol intake:  None            Substance Use      Substance use:  Denies use            REVIEW OF SYSTEMS      General:  Admits: Fatigue      Eye:  Admits: Corrective Lenses      ENT:  Admits: Hearing Loss, Seizures      Respiratory:  Denies: Wheezing      Gastrointestinal:  Denies: Problem Swallowing      Musculoskeletal:  Denies: Muscle Weakness      Integumentary:  Denies: Bruises Easily      Neurologic:  Denies: Numbness\Tingling      Endocrine:  Admits: Diabetes      Hematologic/Lymphatic:  Denies: Transfusions            VITAL SIGNS AND SCORES      Vitals      Height 5 ft 9.00 in / 175.26 cm      Weight 180 lbs 12.435 oz / 82 kg      BSA 1.98 m2      BMI 26.7 kg/m2      Temperature 97.5 F / 36.39 C - Temporal      Pulse 76      Respirations 18      Blood Pressure 121/68 Sitting, Left Arm      Pulse Oximetry 97%            Pain Score      Pain Scale Used:  Numerical      Pain Intensity:  0            Fatigue Score      Fatigue (0-10 scale):  5            EXAM      General Appearance:  Positive for: Alert, Oriented x3, Cooperative;          Negative for: Acute Distress      Eye:  Positive for: Anicteric Sclerae, Moist Conjunctiva      Neck:  Positive for: Supple;          Negative for: JVD, Masses      Respiratory:  Positive for: CTAB, Normal Respiratory Effort      Abdomen/Gastro:  Positive for: Normal Active Bowel Sounds, Soft;          Negative for: Distention, Hepatosplenomegaly, Tenderness      Cardiovascular:  Positive for: RRR;          Negative for: Gallop, Murmur, Peripheral Edema, Rub      Psychiatric:  Positive for: Appropriate Affect, Intact Judgement      Lymphatic:  Negative for: Cervical, Infraclavicular, Supraclavicular            PREVENTION      Hx Influenza Vaccination:  Yes      Date Influenza Vaccine Given:  Oct 1, 2018      Influenza Vaccine Declined:  No      2 or More Falls Past Year?:  No      Fall Past Year with  Injury?:  No      Hx Pneumococcal Vaccination:  Yes      Encouraged to follow-up with:  PCP regarding preventative exams.      Chart initiated by      ASHLEY CIFUENTES MA            ALLERGY/MEDS      Allergies      Coded Allergies:             NO KNOWN DRUG ALLERGIES (Verified  Allergy, Unknown, 8/13/19)            Medications      Last Reconciled on 8/13/19 13:55 by JAY SWIFT      Prochlorperazine Maleate (Prochlorperazine Maleate) 10 Mg Tab      10 MG PO Q6H PRN for NAUSEA AND/OR VOMITING for 30 Days, #90 TAB 4 Refills         Prov: JUAN PABLO WEINSTEIN         8/6/19       Ondansetron Hcl (ONDANSETRON HCL) 8 Mg Tablet      8 MG PO Q8H PRN for NAUSEA, #60 TAB 3 Refills         Prov: JUAN PABLO WEINSTEIN         8/6/19       Insulin Degludec (Tresiba Flextouch U-200) 200 Unit/1 Ml Insuln.pen      80 UNITS SUBQ QDAY for 30 Days, #1 INSULN.PEN         Reported         8/2/19       (Fluticasone)   No Conflict Check      50 MG INH QDAY         Reported         8/2/19       Meclizine Hcl (Meclizine*) 12.5 Mg Tablet      12.5 MG PO TID PRN for VERTIGO, #90 TAB 0 Refills         Reported         8/2/19       Loratadine (Loratadine) 10 Mg Tablet      10 MG PO HS, #30 TAB 0 Refills         Reported         8/2/19       Clotrimazole (Lotrimin 1% Cream) 15 Gm Cream..g.      1 APL TOPICAL BID, #30 GM         Prov: DIANA BARAJAS CFR         4/2/19       Metoprolol Succinate (Metoprolol Succinate) 25 Mg Tab.er.24h      25 MG PO HS         Reported         10/30/18       Losartan Potassium (Losartan*) 25 Mg Tablet      12.5 MG PO QDAY, #30 TAB 0 Refills         Reported         10/30/18       Rosuvastatin Calcium (Crestor*) 20 Mg Tablet      20 MG PO HS         Reported         10/30/18       Citalopram HBr (Citalopram HBr) 20 Mg Tablet      20 MG PO QDAY         Reported         10/30/18       QUEtiapine (QUEtiapine) 25 Mg Tablet      25 MG PO BID         Reported         10/30/18       Ranitidine Hcl (Ranitidine*) 150 Mg Tablet       150 MG PO QDAY, #30 TAB 0 Refills         Reported         10/30/18       Metformin Hcl* (Metformin Hcl*) 850 Mg Tablet      850 MG PO BID         Reported         10/30/18       Clopidogrel Bisulfate (Plavix) 75 Mg Tablet      75 MG PO QDAY         Reported         7/9/07      Medications Reviewed:  No Changes made to meds            IMPRESSION/PLAN      Impression      Lung cancer, squamous cell, stage IIb (T2a, N1, M0)            Diagnosis      Squamous cell carcinoma of left lung - C34.92      Patient has completed staging work-up with a negative brain MRI.  Seen by CT     surgery earlier today.  I discussed case with Dr. Fleming.  He does not feel that    he would be a good candidate for resection and after discussion the patient is     not interested in surgery.  Based on the NCCN guidelines, we discussed     definitive chemoradiation for node positive stage II lung cancer.  He has been     seen by Dr. Sahu with radiation oncology and his note reviewed.  I would     recommend weekly carboplatin AUC 2+ Taxol 50 mg/m ² throughout the course of     radiation.  Also based on the NCCN guidelines, I would then recommend     maintenance imfinzi based on response.  Side effects, risk and benefits     discussed in detail with patient and family.  Written teaching information     provided.  Recent lab work showed adequate endorgan functions and blood counts.     Prescriptions for Zofran and Compazine sent to pharmacy.  He will need to see     radiation oncology for simulation and planning.  This office will coordinate     with radiation oncology regarding start date.            Patient Education            Carboplatin Injection      Chemotherapy      Coping With Fatigue From Chemotherapy      Coping With Nerve and Muscle Effects Related to Chemotherapy      Coping With Pain Related to Cancer and Chemotherapy      Coping with Nausea and Vomiting From Chemotherapy      Paclitaxel Injection      Palliative Care for  Cancer      Patient Education Provided:  Yes            Topics Patient Counseled on      Return to Rad/Onc for planning 8/16 @ 10:30.                 Disclaimer: Converted document may not contain table formatting or lab diagrams. Please see [employer] for the authenticated document.

## 2021-05-28 NOTE — PROGRESS NOTES
"Patient: CRISTI NOVA     Acct: [id number]     Report: #[id number]  UNIT #: [id number]     : 1934    Encounter Date:2019  PRIMARY CARE: Homero Hinojosa  ***Signed***  --------------------------------------------------------------------------------------------------------------------  NURSE INTAKE      Visit Type      New Patient Visit            Chief Complaint      LUNG MASS      Intent of Therapy:  Curative            Referring Provider/Copies To      Referring Provider:  Terra Hickman      Primary Care Provider:  Homero Hinojosa            History and Present Illness      Past Oncology Illness History      Mr. Nova is an 83yo WM recently dx with LLL cancer.  He reports bouts of     abdominal pain/spells and  to additional diagnostic work up that     included CT scan which demostrated a lesion in the LLL.  PET scan completed on     19 showed LLL mass with small amt uptake in left hilum.  Biopsy taken on     19 returned Squamous lung cancer.  Pt reports no significant wt loss.  He     does recollect a nonproductive cough for last several months; however, no     hemoptysis.        PMH: CABG x5 with 2 stents placed , DM, htn, hyperlipidemia.  Pt quit     smoking at age 51, does not drink ETOH or use illicit drugs.      Family hx:  mother-had lung ca-never smoked, maternal aunt lung ca. Father      from blood clot            HPI - Oncology Interim      Initial visit for dx: lung ca--pt does have a dry cough; however, denies     hemoptysis.  He, nor wife, have noted a change in his pulmonary status.  He     reports a good diet; however, has \"gastric\" bouts that cause him pain and dif    ficulty eating.  No significant wt change noted.  Reports RLE swells at times     (vasculature utilized for CABG).  No distress noted.            Cancer Details            [id number]            Clinical Staging      Stage IIA (T1nA5T1)            Treatments    "   Chemotherapy      Wkly Carbo/Taxol      Radiation Therapy      Concurrent LLL XRT            Clinical Trial Participant      No            ECOG Performance Status      0            Most Recent Imaging Findings      7/16/19            PROCEDURE:   PET CT SKULL BASE TO MID THIGH INITIAL             COMPARISON:   [facility], CT, ABD PEL W/O CONTRAST, 7/09/2019,     15:00.             INDICATIONS:   R91.8             TECHNIQUE:   After obtaining the patient's consent, F-18 FDG was administered     intravenously.  A PET       scan with concurrent CT imaging was performed from the skull to the thigh with     multiplanar imaging.             RADIONUCLIDE:     11.45 MCI   F18 FDG- I.V.      LABS:                          Blood Glucose 188 mg/dl             FINDINGS:         Today's study is compared 2 CT of the abdomen pelvis performed on 7/9/2019.      Today's study reveals       that there is a 4.1 cm x 3.5 cm in size mass in the anterior superior aspect of     the left lower lobe       of the lung that touches the inferior lateral aspect of the left pulmonary hilum    has maximum       standard uptake value of 9.01 consistent with cancer.  There is a small focal     tiny area of       increased nuclear PET activity in the left pulmonary hilum with maximum standard    uptake value of       5.66 suspicious for very early metastatic adenopathy.  No evidence of skeletal     metastatic disease.        No evidence of metastatic disease in the liver or adrenal glands.  No evidence     mass or adenopathy       in the mediastinum.  No evidence of mass or metastatic disease in the right     lung.             CONCLUSION:         1. 4.1 cm x 3.5 cm in size mass in the anterior superior aspect of the left     lower lobe of the lung       that touches the inferior lateral aspect of the left pulmonary hilum with     maximum standard uptake       value of 9.01 consistent with cancer.      2. Small tiny dot of increased  nuclear PET activity in the left pulmonary hilum     with maximum       standard uptake value of 5.66 is suspicious for very early metastatic     adenopathy.            PAST, FAMILY   Past Medical History      Past Medical History:  Arthritis, Diabetes Type 2, High Cholesterol, Liver     Disease, Stroke      Other PMH:        CIRRHOSIS      Hematology/Oncology (M):  Lung Cancer, Skin Cancer            Past Surgical History      Cataract Surgery, Cholecystectomy, Coronary Stent, Skin Cancer Removal            Family History      Family History:  Lung Cancer (SISTER AND MOTHER), Skin Cancer (BROTHER)            Social History      Marital Status:        Lives independently:  Yes      Number of Children:  3      Occupation:  RETIRED            Tobacco Use      Tobacco status:  Former smoker (1.5-2 ppd x 35y )            Alcohol Use      Alcohol intake:  None            Substance Use      Substance use:  Denies use            REVIEW OF SYSTEMS      General:  Admits: Fatigue;          Denies: Appetite Change, Fever, Night Sweats, Weight Gain, Weight Loss      Eye:  Admits: Corrective Lenses;          Denies: Blurred Vision, Diplopia, Eye Irritation, Eye Pain, Eye Redness,     Spots in Vision, Vision Loss      ENT:  Admits: Hearing Loss;          Denies: Headache, Hoarseness, Seizures, Sinus Congestion, Sore Throat      Cardiovascular:  Admits: Chest Pain;          Denies: Edema Ankles, Edema Legs, Irregular Heartbeat, Palpitations      Respiratory:  Admits: Productive Cough;          Denies: Coughing Blood, Shortness of Air, Wheezing      Gastrointestinal:  Denies: Bloody Stools, Constipation, Diarrhea, Frequent H    e[place]burn, Nausea, Problem Swallowing, Tarry Stools, Unable to Control Bowels,     Vomiting      Genitourinary (male):  Denies: Blood in Urine, Decrease Urine Stream, Frequent     Urination, Incontinence, Painful Urination      Musculoskeletal:  Denies: Back Pain, Leg Cramps, Muscle Pain, Muscle  Weakness,     Painful Joints, Swollen Joints      Integumentary:  Denies: Bleeds Easily, Bruises Easily, Hair Changes, Jaundice,     Lesions, Mole Changes, Nail Changes, Pigment Changes, Rash, Skin Discoloration      Neurologic:  Denies: Dizziness, Fainting, Numbness\Tingling, Paralysis, Seizures      Psychiatric:  Denies: Anxiety, Confused, Depression, Disoriented, Memory Loss      Endocrine:  Admits: Diabetes;          Denies: Cold Intolerance, Excessive Sweating, Excessive Thirst, Excessive     Urination, Heat Intolerance, Flushing, Hyperthyroidism, Hypothyroidism      Hematologic/Lymphatic:  Denies: Bruising, Bleeding, Enlarged Lymph Nodes,     Recurrent Infections, Transfusions            VITAL SIGNS AND SCORES      Pain Score      Pain Scale Used:  Numerical      Pain Intensity:  8            Fatigue Score      Fatigue (0-10 scale):  5            EXAM      General Appearance:  Positive for: Alert, Oriented x3, Cooperative;          Negative for: Acute Distress      Eye:  Positive for: Anicteric Sclerae, Moist Conjunctiva      Neck:  Positive for: Supple;          Negative for: JVD, Masses      Respiratory:  Positive for: CTAB, Normal Respiratory Effort      Other      Well-healed median sternotomy incision      Abdomen/Gastro:  Positive for: Normal Active Bowel Sounds, Soft;          Negative for: Distention, Hepatosplenomegaly, Tenderness      Cardiovascular:  Positive for: RRR;          Negative for: Gallop, Murmur, Peripheral Edema, Rub      Psychiatric:  Positive for: Appropriate Affect, Intact Judgement      Upper Extremities:  Negative for: Clubbing, Digital Cyanosis, Digital Ischemia      Lymphatic:  Negative for: Cervical, Infraclavicular, Supraclavicular            PREVENTION      Hx Influenza Vaccination:  Yes      Date Influenza Vaccine Given:  Oct 1, 2018      Influenza Vaccine Declined:  No      2 or More Falls Past Year?:  No      Fall Past Year with Injury?:  No      Hx Pneumococcal Vaccination:   Yes      Encouraged to follow-up with:  PCP regarding preventative exams.      Chart initiated by      ASHLEY CIFUENTES MA            ALLERGY/MEDS      Allergies      Coded Allergies:             NO KNOWN DRUG ALLERGIES (Verified  Allergy, Unknown, 8/6/19)            Medications      Last Reconciled on 8/6/19 16:06 by JAY SWIFT      Prochlorperazine Maleate (Prochlorperazine Maleate) 10 Mg Tab      10 MG PO Q6H PRN for NAUSEA AND/OR VOMITING for 30 Days, #90 TAB 4 Refills         Prov: JUAN PABLO WEINSTEIN         8/6/19       Ondansetron Hcl (ONDANSETRON HCL) 8 Mg Tablet      8 MG PO Q8H PRN for NAUSEA, #60 TAB 3 Refills         Prov: JUAN PABLO WEINSTEIN         8/6/19       Insulin Degludec (Tresiba Flextouch U-200) 200 Unit/1 Ml Insuln.pen      80 UNITS SUBQ QDAY for 30 Days, #1 INSULN.PEN         Reported         8/2/19       (Fluticasone)   No Conflict Check      50 MG INH QDAY         Reported         8/2/19       Meclizine Hcl (Meclizine*) 12.5 Mg Tablet      12.5 MG PO TID PRN for VERTIGO, #90 TAB 0 Refills         Reported         8/2/19       Loratadine (Loratadine) 10 Mg Tablet      10 MG PO HS, #30 TAB 0 Refills         Reported         8/2/19       Clotrimazole (Lotrimin 1% Cream) 15 Gm Cream..g.      1 APL TOPICAL BID, #30 GM         Prov: DIANA BARAJAS [facility]         4/2/19       Metoprolol Succinate (Metoprolol Succinate) 25 Mg Tab.er.24h      25 MG PO HS         Reported         10/30/18       Losartan Potassium (Losartan*) 25 Mg Tablet      12.5 MG PO QDAY, #30 TAB 0 Refills         Reported         10/30/18       Rosuvastatin Calcium (Crestor*) 20 Mg Tablet      20 MG PO HS         Reported         10/30/18       Citalopram HBr (Citalopram HBr) 20 Mg Tablet      20 MG PO QDAY         Reported         10/30/18       QUEtiapine (QUEtiapine) 25 Mg Tablet      25 MG PO BID         Reported         10/30/18       Ranitidine Hcl (Ranitidine*) 150 Mg Tablet      150 MG PO QDAY, #30 TAB 0 Refills          Reported         10/30/18       Metformin Hcl* (Metformin Hcl*) 850 Mg Tablet      850 MG PO BID         Reported         10/30/18       Clopidogrel Bisulfate (Plavix) 75 Mg Tablet      75 MG PO QDAY         Reported         7/9/07      Medications Reviewed:  No Changes made to meds            IMPRESSION/PLAN      Impression      Lung cancer, squamous cell, clinical stage IIb (T2b, N1, M0).            Diagnosis      Squamous cell carcinoma of left lung - C34.92      Patient with clinical stage IIb squamous cell carcinoma of the lung.  His case     was presented at multidisciplinary tumor board and discussed in detail with Dr. Arias, pulmonology.  He will have an MRI of the brain to complete staging     evaluation.  He will need to see CT surgery for consideration of resection     although given his current breathing status, I have concerns that his pulmonary     function may preclude a surgical option.  We discussed that for node positive     disease if surgery is not an option then concurrent chemo RT is typically     employed combining daily radiation with weekly chemotherapy such as carboplatin     and Taxol.  He will have lab work today to assess endorgan functions and blood     counts.  I will go ahead and refer him to radiation oncology for discussion of     potential radiation therapy.  Dr. Arias with pulmonology will order pulmonary     function testing.  I will plan to see him back next week along with CT surgery     to finalize treatment planning.      New Diagnostics      * Brain W/WO Cont MRI, As Soon As Possible      * CMP Comp Metabolic Panel, Routine      * CBC With Auto Diff, Routine      New Referrals      * Radiation Therapy, As Soon As Possible         TAMMY KURTZ            Notes      New Medications      * ONDANSETRON HCL 8 MG TABLET: 8 MG PO Q8H PRN NAUSEA #60      * Prochlorperazine Maleate 10 MG TAB: 10 MG PO Q6H PRN NAUSEA AND/OR VOMITING 30       Days #90            Patient  Education            Chemotherapy      Radiation Therapy -- External      Patient Education Provided:  Yes                 Disclaimer: Converted document may not contain table formatting or lab diagrams. Please see [employer] for the authenticated document.

## 2023-12-12 NOTE — PROGRESS NOTES
Patient: CRISTI NOVA     Acct: [id number]     Report: #[id number]  UNIT #: [id number]     : 1934    Encounter Date:2020  PRIMARY CARE: Homero Hinojosa  ***Signed***  --------------------------------------------------------------------------------------------------------------------  NURSE INTAKE      Visit Type      Established Patient Visit            Chief Complaint      LUNG CA F/U            Referring Provider/Copies To      Referring Provider:  Terra Hickman      Primary Care Provider:  Homero Hinojosa      Copies To:   Homero Hinojosa            History and Present Illness      Past Oncology Illness History      Mr. Noav is an 86yo WM recently dx with LLL cancer.  He reports bouts of     abdominal pain/spells and  to additional diagnostic work up that     included CT scan which demostrated a lesion in the LLL.  PET scan completed on     19 showed LLL mass with small amt uptake in left hilum.  Biopsy taken on     19 returned Squamous lung cancer.            HPI - Oncology Interim      Patient returns for follow-up of his lung cancer.  He status post treatment as     outlined.  He is currently on surveillance.  On return, he states his breathing     feels good.  He has been having some trouble with his blood pressure and     lightheadedness for the last couple weeks.  He continues to take losartan and     metoprolol.  Daughter accompanies him today and reports that he has had     increasing forgetfulness.  MRI last year showed a normal brain.  He denies any     masses lymphadenopathy.  He does not eat very well because he does not have an     appetite but denies nausea, vomiting, heartburn.            Cancer Details            [id number]            Clinical Staging      Stage IIA ([id number])            Treatments      Chemotherapy      Wkly Carbo/Taxol initiated 19      Radiation Therapy      Concurrent LLL XRT            Clinical Trial Participant       No            ECOG Performance Status      0            Most Recent Lab Findings      Laboratory Tests      20 11:04            Most Recent Imaging Findings      Patient: CRISTI FRANKS   Acct: #[id number]   Report: #[id number]            UNIT #: [id number]    DOS: 20 1046      INSURANCE:[employer]   ORDER #:[id number]      LOCATION:CT     : 1934            PROVIDERS      ADMITTING:     ATTENDING: WOJCIECH ANTHONY      FAMILY:  Homero Hinojosa   ORDERING:  WOJCIECH ANTHONY         OTHER:    DICTATING:  Dilip Sanchez MD            REQ #:[id number]   EXAM:CHW - CT CHEST with CONTRAST      REASON FOR EXAM:  LUNG CA      REASON FOR VISIT:  LUNG CA            *******Signed******         PROCEDURE:   CT CHEST W/ CONTRAST             COMPARISON:   [facility], CT, CHEST W/ CONTRAST, 2020, 11:27.             INDICATIONS:   LEFT LUNG CANCER             TECHNIQUE:   After obtaining the patient's consent, CT images were obtained with    non-ionic       intravenous contrast material.               PROTOCOL:     Standard imaging protocol performed                RADIATION:     DLP: 529mGy*cm          Automated exposure control was utilized to minimize radiation dose.       CONTRAST:   100cc Isovue 370 I.V.             FINDINGS:         CT of the chest with contrast is compared to previous CT of the chest performed     on 2020 and       compared to previous PET scan performed on 2019.  Today's study reveals the    patient is status       post sternotomy and CABG surgery.  No evidence of mass or adenopathy in the base    of the neck or in       the periclavicular soft tissues or in the axillary soft tissues no evidence of     mass or adenopathy       in the mediastinum or in the right pulmonary melissa.  No evidence of thrombus or     embolus in the right       or left pulmonary artery branches he.  The heart size normal.  No evidence     pericardial effusion.         No evidence of hiatal hernia.  Mild calcified atherosclerotic plaque is seen in     the left coronary       artery.  No acute disease in the superior aspect of the abdomen.  There are     surgical clips in the       gallbladder fossa consistent with cholecystectomy.  No evidence of mass or     adenopathy or ascites in       the abdomen there is a small left posterior basilar pleural effusion has     increased slightly since       previous study.  There is mild pleural-parenchymal thickening in the left lung     apex more prominent       than on previous study.  There are a few questionable small tiny gas bubbles in     the pleural space       overlying the anterior aspect of the left midlung zone not seen on the previous     study .  An       atelectatic consolidating pulmonary infiltrate is seen in the inferior lateral     aspect of the left       pulmonary hilum extending into the left lower lobe of the lung more prominent     than on previous       study.  This infiltrate contains air bronchograms.  No evidence of infiltrate or    effusion or       pneumothorax or mass in the right lung there is a 1 cm in size benign calcified     granuloma in the       anterior lateral aspect of the right lung base.  There is a small focal area of     increased density       and with peripheral regularity in the left lower lobe lung lying just inferior     lateral to the left       pulmonary hilum appears to have decreased in size since the previous study             There is degenerative disc disease and a moderate size calcified posterior disc     protrusion or bone       spur at approximately the T8-T9 level causing moderate spinal canal stenosis     unchanged since the       previous study.  No evidence of metastatic disease in the thoracic spine.             CONCLUSION:         1. Small a mass with irregular borders in the left lower lobe lung lying infer    ior lateral to the       left pulmonary hilum appears to have  decreased in size since previous study perf    ormed on 1/24/2020.      2. Small left pleural effusion surrounding the left lung has increased in size s    mary the previous       study.      3. Atelectatic consolidating infiltrate with air bronchograms in the left     midlung zone and left       lower lobe lung appears to have increased since the previous study      4. No evidence of metastatic disease in the thoracic spine or right lung or in     the liver or in the       adrenal glands.              RAMÍREZ RAGSDALE MD             Electronically Signed and Approved By: RAMÍREZ RAGSDALE MD on 6/11/2020 at     12:00                        Until signed, this is an unconfirmed preliminary report that may contain      errors and is subject to change.                                              GOLKE:      D:06/11/20 1200            PAST, FAMILY   Past Medical History      Past Medical History:  Arthritis, Diabetes Type 2, High Cholesterol, Liver     Disease, Stroke      Other PMH:        CIRRHOSIS      Hematology/Oncology (M):  Lung Cancer, Skin Cancer            Past Surgical History      Cataract Surgery, Cholecystectomy, Coronary Stent, Lung Biopsy, Skin Cancer     Removal            Family History      Family History:  Lung Cancer, Skin Cancer            Social History      Marital Status:        Lives independently:  Yes      Number of Children:  3      Occupation:  RETIRED            Tobacco Use      Tobacco status:  Former smoker            Alcohol Use      Alcohol intake:  None            Substance Use      Substance use:  Denies use            REVIEW OF SYSTEMS      General:  Admits: Fatigue, Weight Loss      Eye:  Admits Corrective Lenses      ENT:  Admits Hearing Loss      Cardiovascular:  Denies Chest Pain      Respiratory:  Denies: Shortness of Air      Gastrointestinal:  Admits Nausea/Vomiting; Denies Problem Swallowing      Musculoskeletal:  Denies Back Pain, Denies Muscle Pain             VITAL SIGNS AND SCORES      Vitals      Weight 161 lbs 13.082 oz / 73.4 kg      Temperature 97.2 F / 36.22 C - Temporal      Pulse 68      Respirations 16      Blood Pressure 91/49 Sitting, Right Arm      Pulse Oximetry 95%, RM AIR            Pain Score      Pain Scale Used:  Numerical      Pain Intensity:  0            Fatigue Score      Fatigue (0-10 scale):  0 (none)            EXAM      General Appearance:  Positive for: Alert, Cooperative;          Negative for: Acute Distress      Eye:  Positive for: Anicteric Sclerae, Moist Conjunctiva      Neck:  Positive for: Supple;          Negative for: JVD, Masses      Respiratory:  Positive for: CTAB, Normal Respiratory Effort      Abdomen/Gastro:  Positive for: Normal Active Bowel Sounds, Soft;          Negative for: Distention, Hepatosplenomegaly, Tenderness      Cardiovascular:  Positive for: RRR;          Negative for: Gallop, Murmur, Peripheral Edema, Rub      Psychiatric:  Positive for: Appropriate Affect, Intact Judgement      Lymphatic:  Negative for: Cervical, Infraclavicular, Supraclavicular            PREVENTION      Hx Influenza Vaccination:  Yes      Date Influenza Vaccine Given:  Nov 1, 2019      Influenza Vaccine Declined:  No      2 or More Falls Past Year?:  No      Fall Past Year with Injury?:  No      Hx Pneumococcal Vaccination:  Yes      Encouraged to follow-up with:  PCP regarding preventative exams.      Chart initiated by      ASHLEY CIFUENTES MA            ALLERGY/MEDS      Allergies      Coded Allergies:             NO KNOWN DRUG ALLERGIES (Verified  Allergy, Unknown, 6/17/20)            Medications      Last Reconciled on 6/17/20 12:56 by WOJCIECH ANTHONY      Ondansetron Hcl (ONDANSETRON HCL) 4 Mg Tablet      4 MG PO Q6H PRN for NAUSEA, TAB 0 Refills         Reported         6/17/20       Benzonatate (Tessalon Perles) 100 Mg Cap      100 MG PO TID PRN for COUGH for 30 Days, #90 CAP 3 Refills         Prov: WOJCIECH ANTHONY         2/3/20        guaiFENesin -30 Mg (Mucinex DM -30 Mg Tablet) 1 Each Tab.er.12h      1 TAB PO BID, #24 TAB.ER         Prov: SattaConstance caballeromil cfr         1/23/20       Acetaminophen Es (Tylenol Extra Strength) 500 Mg Tablet      500 MG PO Q6H PRN for PAIN OR FEVER, #30 TAB 0 Refills         Prov: Sattaur,Zamil cfr         1/23/20       Benzonatate (Tessalon Perles) 100 Mg Cap      100 MG PO TID, #30 CAP         Prov: Sattaur,Zamil cfr         1/23/20       Cefdinir (CEFDINIR) 300 Mg Cap      300 MG PO BID, #20 CAP         Prov: Sattaur,Zamil cfr         1/23/20       Oseltamivir Phosphate (Tamiflu*) 75 Mg Capsule      75 MG PO BID, #10 CAP         Prov: Sattaur,Zamil cfr         1/23/20       Insulin Degludec (Tresiba Flextouch U-200) 200 Unit/1 Ml Insuln.pen      80 UNITS SUBQ QDAY for 30 Days, #1 INSULN.PEN         Reported         8/2/19       (Fluticasone)   No Conflict Check      50 MG INH QDAY PRN for ALLERGIES         Reported         8/2/19       Meclizine Hcl (Meclizine*) 12.5 Mg Tablet      12.5 MG PO TID PRN for VERTIGO, #90 TAB 0 Refills         Reported         8/2/19       Metoprolol Succinate (Metoprolol Succinate) 25 Mg Tab.er.24h      25 MG PO HS         Reported         10/30/18       Losartan Potassium (Losartan*) 25 Mg Tablet      12.5 MG PO QDAY, #30 TAB 0 Refills         Reported         10/30/18       Rosuvastatin Calcium (Crestor*) 20 Mg Tablet      20 MG PO HS         Reported         10/30/18       Citalopram HBr (Citalopram HBr) 20 Mg Tablet      20 MG PO QDAY         Reported         10/30/18       QUEtiapine (QUEtiapine) 25 Mg Tablet      25 MG PO BID         Reported         10/30/18       raNITIdine HCL (raNITIdine HCL) 150 Mg Tablet      150 MG PO QDAY PRN for HEARTBURN, #30 TAB 0 Refills         Reported         10/30/18       metFORMIN HCl (metFORMIN HCl) 850 Mg Tablet      850 MG PO BID         Reported         10/30/18       Clopidogrel Bisulfate (Plavix) 75 Mg Tablet      75 MG PO QDAY          Reported         7/9/07      Medications Reviewed:  Changes made to meds            IMPRESSION/PLAN      Diagnosis      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  Patient's recent CT scan shows decrease in     the size of the previous nodule suggesting response to therapy.  No evidence for     progression or recurrent disease.  Repeat scans in 3 months            Pleural effusion - J90      Patient denies symptoms.  We discussed thoracentesis but he is not interested at     this time.  Continue to monitor for symptoms            Hypotension         Hypotension due to drugs         Hypotension type: hypotension due to drug      I will have him hold losartan.  Continue metoprolol.  He should monitor his     blood pressure daily at home.  If it does not improve, follow-up with PCP.            Altered mental status         Altered mental status, unspecified altered mental status type         Altered mental status type: unspecified      Daughter reports more forgetfulness.  I will repeat MRI of the brain.  If no     evidence for metastatic disease, consider neurology evaluation            Notes      New Medications      * ONDANSETRON HCL 4 MG TABLET: 4 MG PO Q6H PRN NAUSEA      New Diagnostics      * Brain W/WO Cont MRI, Stat         Dx: Memory loss - R41.3      * Chest W/ Cont CT, 3 Months         Dx: Squamous cell carcinoma of left lung - C34.92      * CBC With Auto Diff, 3 Months         Dx: Squamous cell carcinoma of left lung - C34.92      * CMP Comp Metabolic Panel, 3 Months         Dx: Squamous cell carcinoma of left lung - C34.92            Patient Education      Patient Education Provided:  Yes            Electronically signed by WOJCIECH ANTHONY  06/17/2020 12:56       Disclaimer: Converted document may not contain table formatting or lab diagrams. Please see [employer] [facility] for the authenticated document.   Unable to Assess